# Patient Record
Sex: MALE | Race: WHITE | NOT HISPANIC OR LATINO
[De-identification: names, ages, dates, MRNs, and addresses within clinical notes are randomized per-mention and may not be internally consistent; named-entity substitution may affect disease eponyms.]

---

## 2017-02-08 ENCOUNTER — RX RENEWAL (OUTPATIENT)
Age: 57
End: 2017-02-08

## 2017-03-02 ENCOUNTER — OUTPATIENT (OUTPATIENT)
Dept: OUTPATIENT SERVICES | Facility: HOSPITAL | Age: 57
LOS: 1 days | End: 2017-03-02
Payer: COMMERCIAL

## 2017-03-02 VITALS
HEART RATE: 74 BPM | WEIGHT: 263.89 LBS | DIASTOLIC BLOOD PRESSURE: 82 MMHG | HEIGHT: 70 IN | TEMPERATURE: 99 F | OXYGEN SATURATION: 98 % | RESPIRATION RATE: 20 BRPM | SYSTOLIC BLOOD PRESSURE: 124 MMHG

## 2017-03-02 DIAGNOSIS — M54.12 RADICULOPATHY, CERVICAL REGION: ICD-10-CM

## 2017-03-02 DIAGNOSIS — J45.20 MILD INTERMITTENT ASTHMA, UNCOMPLICATED: ICD-10-CM

## 2017-03-02 DIAGNOSIS — Z98.1 ARTHRODESIS STATUS: Chronic | ICD-10-CM

## 2017-03-02 DIAGNOSIS — M48.02 SPINAL STENOSIS, CERVICAL REGION: ICD-10-CM

## 2017-03-02 DIAGNOSIS — Z01.818 ENCOUNTER FOR OTHER PREPROCEDURAL EXAMINATION: ICD-10-CM

## 2017-03-02 DIAGNOSIS — Z86.69 PERSONAL HISTORY OF OTHER DISEASES OF THE NERVOUS SYSTEM AND SENSE ORGANS: ICD-10-CM

## 2017-03-02 DIAGNOSIS — Z98.89 OTHER SPECIFIED POSTPROCEDURAL STATES: Chronic | ICD-10-CM

## 2017-03-02 DIAGNOSIS — I10 ESSENTIAL (PRIMARY) HYPERTENSION: ICD-10-CM

## 2017-03-02 DIAGNOSIS — Z90.49 ACQUIRED ABSENCE OF OTHER SPECIFIED PARTS OF DIGESTIVE TRACT: Chronic | ICD-10-CM

## 2017-03-02 LAB
ANION GAP SERPL CALC-SCNC: 14 MMOL/L — SIGNIFICANT CHANGE UP (ref 5–17)
BLD GP AB SCN SERPL QL: NEGATIVE — SIGNIFICANT CHANGE UP
BUN SERPL-MCNC: 8 MG/DL — SIGNIFICANT CHANGE UP (ref 7–23)
CALCIUM SERPL-MCNC: 9.6 MG/DL — SIGNIFICANT CHANGE UP (ref 8.4–10.5)
CHLORIDE SERPL-SCNC: 103 MMOL/L — SIGNIFICANT CHANGE UP (ref 96–108)
CO2 SERPL-SCNC: 19 MMOL/L — LOW (ref 22–31)
CREAT SERPL-MCNC: 0.81 MG/DL — SIGNIFICANT CHANGE UP (ref 0.5–1.3)
GLUCOSE SERPL-MCNC: 101 MG/DL — HIGH (ref 70–99)
HCT VFR BLD CALC: 45.7 % — SIGNIFICANT CHANGE UP (ref 39–50)
HGB BLD-MCNC: 15.4 G/DL — SIGNIFICANT CHANGE UP (ref 13–17)
MCHC RBC-ENTMCNC: 29.5 PG — SIGNIFICANT CHANGE UP (ref 27–34)
MCHC RBC-ENTMCNC: 33.7 GM/DL — SIGNIFICANT CHANGE UP (ref 32–36)
MCV RBC AUTO: 87.5 FL — SIGNIFICANT CHANGE UP (ref 80–100)
PLATELET # BLD AUTO: 277 K/UL — SIGNIFICANT CHANGE UP (ref 150–400)
POTASSIUM SERPL-MCNC: 4.3 MMOL/L — SIGNIFICANT CHANGE UP (ref 3.5–5.3)
POTASSIUM SERPL-SCNC: 4.3 MMOL/L — SIGNIFICANT CHANGE UP (ref 3.5–5.3)
RBC # BLD: 5.22 M/UL — SIGNIFICANT CHANGE UP (ref 4.2–5.8)
RBC # FLD: 13.9 % — SIGNIFICANT CHANGE UP (ref 10.3–14.5)
RH IG SCN BLD-IMP: POSITIVE — SIGNIFICANT CHANGE UP
SODIUM SERPL-SCNC: 136 MMOL/L — SIGNIFICANT CHANGE UP (ref 135–145)
WBC # BLD: 9.2 K/UL — SIGNIFICANT CHANGE UP (ref 3.8–10.5)
WBC # FLD AUTO: 9.2 K/UL — SIGNIFICANT CHANGE UP (ref 3.8–10.5)

## 2017-03-02 PROCEDURE — 80048 BASIC METABOLIC PNL TOTAL CA: CPT

## 2017-03-02 PROCEDURE — 86900 BLOOD TYPING SEROLOGIC ABO: CPT

## 2017-03-02 PROCEDURE — 85027 COMPLETE CBC AUTOMATED: CPT

## 2017-03-02 PROCEDURE — 86901 BLOOD TYPING SEROLOGIC RH(D): CPT

## 2017-03-02 PROCEDURE — G0463: CPT

## 2017-03-02 PROCEDURE — 86850 RBC ANTIBODY SCREEN: CPT

## 2017-03-02 RX ORDER — CEFAZOLIN SODIUM 1 G
2000 VIAL (EA) INJECTION ONCE
Qty: 0 | Refills: 0 | Status: DISCONTINUED | OUTPATIENT
Start: 2017-03-06 | End: 2017-03-07

## 2017-03-02 RX ORDER — SODIUM CHLORIDE 9 MG/ML
3 INJECTION INTRAMUSCULAR; INTRAVENOUS; SUBCUTANEOUS EVERY 8 HOURS
Qty: 0 | Refills: 0 | Status: DISCONTINUED | OUTPATIENT
Start: 2017-03-06 | End: 2017-03-06

## 2017-03-02 NOTE — H&P PST ADULT - HISTORY OF PRESENT ILLNESS
56 year old male with HTN, c/o numbness and pain radiating to right upper extremity, scheduled for C5-6 anterior cervical discectomy and fusion for radiculopathy, cervical region.

## 2017-03-02 NOTE — H&P PST ADULT - PMH
BPH (benign prostatic hypertrophy)    H/O sleep apnea  uses cpap  HTN (hypertension)    Mild intermittent asthma without complication  no h/o intubation for asthma  Stenosis of cervical spine  C6

## 2017-03-02 NOTE — H&P PST ADULT - PROBLEM SELECTOR PLAN 2
patient instructed to continue antihypertensive medication keith-op  patient is scheduled to be evaluated by his cardiologist 3/2/17

## 2017-03-03 ENCOUNTER — APPOINTMENT (OUTPATIENT)
Dept: CARDIOLOGY | Facility: CLINIC | Age: 57
End: 2017-03-03

## 2017-03-03 ENCOUNTER — NON-APPOINTMENT (OUTPATIENT)
Age: 57
End: 2017-03-03

## 2017-03-03 DIAGNOSIS — M54.12 RADICULOPATHY, CERVICAL REGION: ICD-10-CM

## 2017-03-03 DIAGNOSIS — N40.0 BENIGN PROSTATIC HYPERPLASIA WITHOUT LOWER URINARY TRACT SYMPMS: ICD-10-CM

## 2017-03-03 DIAGNOSIS — Z01.810 ENCOUNTER FOR PREPROCEDURAL CARDIOVASCULAR EXAMINATION: ICD-10-CM

## 2017-03-03 DIAGNOSIS — M54.2 CERVICALGIA: ICD-10-CM

## 2017-03-03 DIAGNOSIS — G89.29 CERVICALGIA: ICD-10-CM

## 2017-03-03 DIAGNOSIS — Z78.9 OTHER SPECIFIED HEALTH STATUS: ICD-10-CM

## 2017-03-03 DIAGNOSIS — R94.31 ABNORMAL ELECTROCARDIOGRAM [ECG] [EKG]: ICD-10-CM

## 2017-03-04 RX ORDER — VENLAFAXINE HYDROCHLORIDE 37.5 MG/1
37.5 CAPSULE, EXTENDED RELEASE ORAL
Qty: 30 | Refills: 0 | Status: DISCONTINUED | COMMUNITY
Start: 2017-01-03

## 2017-03-04 RX ORDER — ACYCLOVIR 400 MG/1
400 TABLET ORAL
Qty: 15 | Refills: 0 | Status: DISCONTINUED | COMMUNITY
Start: 2017-02-10

## 2017-03-04 RX ORDER — FLUTICASONE FUROATE AND VILANTEROL TRIFENATATE 100; 25 UG/1; UG/1
100-25 POWDER RESPIRATORY (INHALATION)
Qty: 60 | Refills: 0 | Status: ACTIVE | COMMUNITY
Start: 2016-10-27

## 2017-03-04 RX ORDER — AZELASTINE HYDROCHLORIDE AND FLUTICASONE PROPIONATE 137; 50 UG/1; UG/1
137-50 SPRAY, METERED NASAL
Qty: 23 | Refills: 0 | Status: ACTIVE | COMMUNITY
Start: 2016-09-14

## 2017-03-04 RX ORDER — DULOXETINE HYDROCHLORIDE 30 MG/1
30 CAPSULE, DELAYED RELEASE PELLETS ORAL
Qty: 30 | Refills: 0 | Status: DISCONTINUED | COMMUNITY
Start: 2016-09-29

## 2017-03-04 RX ORDER — VENLAFAXINE 37.5 MG/1
37.5 TABLET, EXTENDED RELEASE ORAL
Qty: 30 | Refills: 0 | Status: DISCONTINUED | COMMUNITY
Start: 2016-11-22

## 2017-03-06 ENCOUNTER — INPATIENT (INPATIENT)
Facility: HOSPITAL | Age: 57
LOS: 1 days | Discharge: HOME | DRG: 473 | End: 2017-03-08
Attending: NEUROLOGICAL SURGERY | Admitting: NEUROLOGICAL SURGERY
Payer: COMMERCIAL

## 2017-03-06 VITALS
HEART RATE: 68 BPM | WEIGHT: 263.89 LBS | DIASTOLIC BLOOD PRESSURE: 86 MMHG | RESPIRATION RATE: 20 BRPM | TEMPERATURE: 98 F | SYSTOLIC BLOOD PRESSURE: 138 MMHG | OXYGEN SATURATION: 96 % | HEIGHT: 70 IN

## 2017-03-06 DIAGNOSIS — M54.12 RADICULOPATHY, CERVICAL REGION: ICD-10-CM

## 2017-03-06 DIAGNOSIS — Z90.49 ACQUIRED ABSENCE OF OTHER SPECIFIED PARTS OF DIGESTIVE TRACT: Chronic | ICD-10-CM

## 2017-03-06 DIAGNOSIS — Z98.1 ARTHRODESIS STATUS: Chronic | ICD-10-CM

## 2017-03-06 DIAGNOSIS — Z98.89 OTHER SPECIFIED POSTPROCEDURAL STATES: Chronic | ICD-10-CM

## 2017-03-06 LAB
ANION GAP SERPL CALC-SCNC: 12 MMOL/L — SIGNIFICANT CHANGE UP (ref 5–17)
BASOPHILS # BLD AUTO: 0 K/UL — SIGNIFICANT CHANGE UP (ref 0–0.2)
BASOPHILS NFR BLD AUTO: 0.3 % — SIGNIFICANT CHANGE UP (ref 0–2)
BUN SERPL-MCNC: 9 MG/DL — SIGNIFICANT CHANGE UP (ref 7–23)
CALCIUM SERPL-MCNC: 8.6 MG/DL — SIGNIFICANT CHANGE UP (ref 8.4–10.5)
CHLORIDE SERPL-SCNC: 105 MMOL/L — SIGNIFICANT CHANGE UP (ref 96–108)
CO2 SERPL-SCNC: 23 MMOL/L — SIGNIFICANT CHANGE UP (ref 22–31)
CREAT SERPL-MCNC: 0.8 MG/DL — SIGNIFICANT CHANGE UP (ref 0.5–1.3)
EOSINOPHIL # BLD AUTO: 0.3 K/UL — SIGNIFICANT CHANGE UP (ref 0–0.5)
EOSINOPHIL NFR BLD AUTO: 2.2 % — SIGNIFICANT CHANGE UP (ref 0–6)
GLUCOSE SERPL-MCNC: 117 MG/DL — HIGH (ref 70–99)
HCT VFR BLD CALC: 42.2 % — SIGNIFICANT CHANGE UP (ref 39–50)
HGB BLD-MCNC: 14.3 G/DL — SIGNIFICANT CHANGE UP (ref 13–17)
LYMPHOCYTES # BLD AUTO: 19.9 % — SIGNIFICANT CHANGE UP (ref 13–44)
LYMPHOCYTES # BLD AUTO: 2.2 K/UL — SIGNIFICANT CHANGE UP (ref 1–3.3)
MCHC RBC-ENTMCNC: 30.2 PG — SIGNIFICANT CHANGE UP (ref 27–34)
MCHC RBC-ENTMCNC: 34 GM/DL — SIGNIFICANT CHANGE UP (ref 32–36)
MCV RBC AUTO: 88.8 FL — SIGNIFICANT CHANGE UP (ref 80–100)
MONOCYTES # BLD AUTO: 0.9 K/UL — SIGNIFICANT CHANGE UP (ref 0–0.9)
MONOCYTES NFR BLD AUTO: 8.4 % — SIGNIFICANT CHANGE UP (ref 2–14)
NEUTROPHILS # BLD AUTO: 7.8 K/UL — HIGH (ref 1.8–7.4)
NEUTROPHILS NFR BLD AUTO: 69.2 % — SIGNIFICANT CHANGE UP (ref 43–77)
PLATELET # BLD AUTO: 250 K/UL — SIGNIFICANT CHANGE UP (ref 150–400)
POTASSIUM SERPL-MCNC: 3.9 MMOL/L — SIGNIFICANT CHANGE UP (ref 3.5–5.3)
POTASSIUM SERPL-SCNC: 3.9 MMOL/L — SIGNIFICANT CHANGE UP (ref 3.5–5.3)
RBC # BLD: 4.75 M/UL — SIGNIFICANT CHANGE UP (ref 4.2–5.8)
RBC # FLD: 12.9 % — SIGNIFICANT CHANGE UP (ref 10.3–14.5)
RH IG SCN BLD-IMP: POSITIVE — SIGNIFICANT CHANGE UP
SODIUM SERPL-SCNC: 140 MMOL/L — SIGNIFICANT CHANGE UP (ref 135–145)
WBC # BLD: 11.2 K/UL — HIGH (ref 3.8–10.5)
WBC # FLD AUTO: 11.2 K/UL — HIGH (ref 3.8–10.5)

## 2017-03-06 RX ORDER — HYDROMORPHONE HYDROCHLORIDE 2 MG/ML
1 INJECTION INTRAMUSCULAR; INTRAVENOUS; SUBCUTANEOUS EVERY 6 HOURS
Qty: 0 | Refills: 0 | Status: DISCONTINUED | OUTPATIENT
Start: 2017-03-06 | End: 2017-03-08

## 2017-03-06 RX ORDER — CEFAZOLIN SODIUM 1 G
2000 VIAL (EA) INJECTION EVERY 8 HOURS
Qty: 0 | Refills: 0 | Status: COMPLETED | OUTPATIENT
Start: 2017-03-06 | End: 2017-03-07

## 2017-03-06 RX ORDER — ONDANSETRON 8 MG/1
4 TABLET, FILM COATED ORAL EVERY 4 HOURS
Qty: 0 | Refills: 0 | Status: DISCONTINUED | OUTPATIENT
Start: 2017-03-06 | End: 2017-03-08

## 2017-03-06 RX ORDER — ONDANSETRON 8 MG/1
4 TABLET, FILM COATED ORAL ONCE
Qty: 0 | Refills: 0 | Status: DISCONTINUED | OUTPATIENT
Start: 2017-03-06 | End: 2017-03-07

## 2017-03-06 RX ORDER — GABAPENTIN 400 MG/1
300 CAPSULE ORAL THREE TIMES A DAY
Qty: 0 | Refills: 0 | Status: DISCONTINUED | OUTPATIENT
Start: 2017-03-06 | End: 2017-03-08

## 2017-03-06 RX ORDER — DILTIAZEM HCL 120 MG
240 CAPSULE, EXT RELEASE 24 HR ORAL DAILY
Qty: 0 | Refills: 0 | Status: DISCONTINUED | OUTPATIENT
Start: 2017-03-06 | End: 2017-03-08

## 2017-03-06 RX ORDER — FLUTICASONE PROPIONATE AND SALMETEROL 50; 250 UG/1; UG/1
1 POWDER ORAL; RESPIRATORY (INHALATION)
Qty: 0 | Refills: 0 | Status: DISCONTINUED | OUTPATIENT
Start: 2017-03-06 | End: 2017-03-08

## 2017-03-06 RX ORDER — DOCUSATE SODIUM 100 MG
100 CAPSULE ORAL THREE TIMES A DAY
Qty: 0 | Refills: 0 | Status: DISCONTINUED | OUTPATIENT
Start: 2017-03-06 | End: 2017-03-08

## 2017-03-06 RX ORDER — SENNA PLUS 8.6 MG/1
2 TABLET ORAL AT BEDTIME
Qty: 0 | Refills: 0 | Status: DISCONTINUED | OUTPATIENT
Start: 2017-03-06 | End: 2017-03-08

## 2017-03-06 RX ORDER — ACETAMINOPHEN 500 MG
650 TABLET ORAL EVERY 6 HOURS
Qty: 0 | Refills: 0 | Status: DISCONTINUED | OUTPATIENT
Start: 2017-03-06 | End: 2017-03-08

## 2017-03-06 RX ORDER — ENOXAPARIN SODIUM 100 MG/ML
40 INJECTION SUBCUTANEOUS EVERY 24 HOURS
Qty: 0 | Refills: 0 | Status: DISCONTINUED | OUTPATIENT
Start: 2017-03-07 | End: 2017-03-08

## 2017-03-06 RX ORDER — VENLAFAXINE HCL 75 MG
37.5 CAPSULE, EXT RELEASE 24 HR ORAL DAILY
Qty: 0 | Refills: 0 | Status: DISCONTINUED | OUTPATIENT
Start: 2017-03-06 | End: 2017-03-08

## 2017-03-06 RX ORDER — LISINOPRIL 2.5 MG/1
10 TABLET ORAL DAILY
Qty: 0 | Refills: 0 | Status: DISCONTINUED | OUTPATIENT
Start: 2017-03-06 | End: 2017-03-08

## 2017-03-06 RX ORDER — SODIUM CHLORIDE 9 MG/ML
1000 INJECTION INTRAMUSCULAR; INTRAVENOUS; SUBCUTANEOUS
Qty: 0 | Refills: 0 | Status: DISCONTINUED | OUTPATIENT
Start: 2017-03-06 | End: 2017-03-08

## 2017-03-06 RX ORDER — HYDROMORPHONE HYDROCHLORIDE 2 MG/ML
0.5 INJECTION INTRAMUSCULAR; INTRAVENOUS; SUBCUTANEOUS
Qty: 0 | Refills: 0 | Status: DISCONTINUED | OUTPATIENT
Start: 2017-03-06 | End: 2017-03-06

## 2017-03-06 RX ADMIN — HYDROMORPHONE HYDROCHLORIDE 0.5 MILLIGRAM(S): 2 INJECTION INTRAMUSCULAR; INTRAVENOUS; SUBCUTANEOUS at 19:00

## 2017-03-06 RX ADMIN — HYDROMORPHONE HYDROCHLORIDE 0.5 MILLIGRAM(S): 2 INJECTION INTRAMUSCULAR; INTRAVENOUS; SUBCUTANEOUS at 18:15

## 2017-03-06 RX ADMIN — SODIUM CHLORIDE 100 MILLILITER(S): 9 INJECTION INTRAMUSCULAR; INTRAVENOUS; SUBCUTANEOUS at 19:18

## 2017-03-06 RX ADMIN — FLUTICASONE PROPIONATE AND SALMETEROL 1 DOSE(S): 50; 250 POWDER ORAL; RESPIRATORY (INHALATION) at 23:00

## 2017-03-06 RX ADMIN — Medication 100 MILLIGRAM(S): at 21:35

## 2017-03-06 RX ADMIN — HYDROMORPHONE HYDROCHLORIDE 0.5 MILLIGRAM(S): 2 INJECTION INTRAMUSCULAR; INTRAVENOUS; SUBCUTANEOUS at 18:30

## 2017-03-06 RX ADMIN — Medication 100 MILLIGRAM(S): at 23:08

## 2017-03-06 RX ADMIN — GABAPENTIN 300 MILLIGRAM(S): 400 CAPSULE ORAL at 21:35

## 2017-03-06 RX ADMIN — HYDROMORPHONE HYDROCHLORIDE 0.5 MILLIGRAM(S): 2 INJECTION INTRAMUSCULAR; INTRAVENOUS; SUBCUTANEOUS at 18:00

## 2017-03-06 RX ADMIN — SENNA PLUS 2 TABLET(S): 8.6 TABLET ORAL at 21:35

## 2017-03-06 RX ADMIN — Medication 240 MILLIGRAM(S): at 22:01

## 2017-03-06 RX ADMIN — HYDROMORPHONE HYDROCHLORIDE 0.5 MILLIGRAM(S): 2 INJECTION INTRAMUSCULAR; INTRAVENOUS; SUBCUTANEOUS at 18:45

## 2017-03-06 RX ADMIN — HYDROMORPHONE HYDROCHLORIDE 0.5 MILLIGRAM(S): 2 INJECTION INTRAMUSCULAR; INTRAVENOUS; SUBCUTANEOUS at 17:45

## 2017-03-07 ENCOUNTER — TRANSCRIPTION ENCOUNTER (OUTPATIENT)
Age: 57
End: 2017-03-07

## 2017-03-07 LAB
ANION GAP SERPL CALC-SCNC: 15 MMOL/L — SIGNIFICANT CHANGE UP (ref 5–17)
BASOPHILS # BLD AUTO: 0 K/UL — SIGNIFICANT CHANGE UP (ref 0–0.2)
BASOPHILS NFR BLD AUTO: 0 % — SIGNIFICANT CHANGE UP (ref 0–2)
BUN SERPL-MCNC: 9 MG/DL — SIGNIFICANT CHANGE UP (ref 7–23)
CALCIUM SERPL-MCNC: 8.8 MG/DL — SIGNIFICANT CHANGE UP (ref 8.4–10.5)
CHLORIDE SERPL-SCNC: 101 MMOL/L — SIGNIFICANT CHANGE UP (ref 96–108)
CO2 SERPL-SCNC: 20 MMOL/L — LOW (ref 22–31)
CREAT SERPL-MCNC: 0.8 MG/DL — SIGNIFICANT CHANGE UP (ref 0.5–1.3)
EOSINOPHIL # BLD AUTO: 0 K/UL — SIGNIFICANT CHANGE UP (ref 0–0.5)
EOSINOPHIL NFR BLD AUTO: 0.2 % — SIGNIFICANT CHANGE UP (ref 0–6)
GLUCOSE SERPL-MCNC: 202 MG/DL — HIGH (ref 70–99)
HCT VFR BLD CALC: 41.2 % — SIGNIFICANT CHANGE UP (ref 39–50)
HGB BLD-MCNC: 13.9 G/DL — SIGNIFICANT CHANGE UP (ref 13–17)
LYMPHOCYTES # BLD AUTO: 0.7 K/UL — LOW (ref 1–3.3)
LYMPHOCYTES # BLD AUTO: 5.8 % — LOW (ref 13–44)
MCHC RBC-ENTMCNC: 29.9 PG — SIGNIFICANT CHANGE UP (ref 27–34)
MCHC RBC-ENTMCNC: 33.9 GM/DL — SIGNIFICANT CHANGE UP (ref 32–36)
MCV RBC AUTO: 88.4 FL — SIGNIFICANT CHANGE UP (ref 80–100)
MONOCYTES # BLD AUTO: 0.3 K/UL — SIGNIFICANT CHANGE UP (ref 0–0.9)
MONOCYTES NFR BLD AUTO: 3 % — SIGNIFICANT CHANGE UP (ref 2–14)
NEUTROPHILS # BLD AUTO: 10.3 K/UL — HIGH (ref 1.8–7.4)
NEUTROPHILS NFR BLD AUTO: 91 % — HIGH (ref 43–77)
PLATELET # BLD AUTO: 244 K/UL — SIGNIFICANT CHANGE UP (ref 150–400)
POTASSIUM SERPL-MCNC: 4.5 MMOL/L — SIGNIFICANT CHANGE UP (ref 3.5–5.3)
POTASSIUM SERPL-SCNC: 4.5 MMOL/L — SIGNIFICANT CHANGE UP (ref 3.5–5.3)
RBC # BLD: 4.66 M/UL — SIGNIFICANT CHANGE UP (ref 4.2–5.8)
RBC # FLD: 12.6 % — SIGNIFICANT CHANGE UP (ref 10.3–14.5)
SODIUM SERPL-SCNC: 136 MMOL/L — SIGNIFICANT CHANGE UP (ref 135–145)
WBC # BLD: 11.4 K/UL — HIGH (ref 3.8–10.5)
WBC # FLD AUTO: 11.4 K/UL — HIGH (ref 3.8–10.5)

## 2017-03-07 PROCEDURE — 72125 CT NECK SPINE W/O DYE: CPT | Mod: 26

## 2017-03-07 RX ORDER — DOCUSATE SODIUM 100 MG
1 CAPSULE ORAL
Qty: 0 | Refills: 0 | COMMUNITY
Start: 2017-03-07

## 2017-03-07 RX ORDER — OXYCODONE AND ACETAMINOPHEN 5; 325 MG/1; MG/1
1 TABLET ORAL
Qty: 42 | Refills: 0
Start: 2017-03-07 | End: 2017-03-14

## 2017-03-07 RX ORDER — ACETAMINOPHEN 500 MG
2 TABLET ORAL
Qty: 0 | Refills: 0 | COMMUNITY
Start: 2017-03-07

## 2017-03-07 RX ORDER — SENNA PLUS 8.6 MG/1
2 TABLET ORAL
Qty: 0 | Refills: 0 | COMMUNITY
Start: 2017-03-07

## 2017-03-07 RX ORDER — BENZOCAINE 10 %
1 GEL (GRAM) MUCOUS MEMBRANE EVERY 8 HOURS
Qty: 0 | Refills: 0 | Status: DISCONTINUED | OUTPATIENT
Start: 2017-03-07 | End: 2017-03-08

## 2017-03-07 RX ORDER — SENNA PLUS 8.6 MG/1
2 TABLET ORAL
Qty: 20 | Refills: 0
Start: 2017-03-07 | End: 2017-03-17

## 2017-03-07 RX ORDER — DOCUSATE SODIUM 100 MG
1 CAPSULE ORAL
Qty: 30 | Refills: 0
Start: 2017-03-07 | End: 2017-03-17

## 2017-03-07 RX ORDER — SODIUM CHLORIDE 0.65 %
1 AEROSOL, SPRAY (ML) NASAL EVERY 6 HOURS
Qty: 0 | Refills: 0 | Status: DISCONTINUED | OUTPATIENT
Start: 2017-03-07 | End: 2017-03-08

## 2017-03-07 RX ORDER — ACETAMINOPHEN 500 MG
2 TABLET ORAL
Qty: 0 | Refills: 0 | DISCHARGE
Start: 2017-03-07

## 2017-03-07 RX ORDER — OXYCODONE HYDROCHLORIDE 5 MG/1
1 TABLET ORAL
Qty: 0 | Refills: 0 | COMMUNITY
Start: 2017-03-07

## 2017-03-07 RX ADMIN — GABAPENTIN 300 MILLIGRAM(S): 400 CAPSULE ORAL at 21:40

## 2017-03-07 RX ADMIN — Medication 100 MILLIGRAM(S): at 14:22

## 2017-03-07 RX ADMIN — SODIUM CHLORIDE 100 MILLILITER(S): 9 INJECTION INTRAMUSCULAR; INTRAVENOUS; SUBCUTANEOUS at 06:00

## 2017-03-07 RX ADMIN — GABAPENTIN 300 MILLIGRAM(S): 400 CAPSULE ORAL at 14:22

## 2017-03-07 RX ADMIN — Medication 100 MILLIGRAM(S): at 06:15

## 2017-03-07 RX ADMIN — HYDROMORPHONE HYDROCHLORIDE 1 MILLIGRAM(S): 2 INJECTION INTRAMUSCULAR; INTRAVENOUS; SUBCUTANEOUS at 22:20

## 2017-03-07 RX ADMIN — LISINOPRIL 10 MILLIGRAM(S): 2.5 TABLET ORAL at 06:15

## 2017-03-07 RX ADMIN — FLUTICASONE PROPIONATE AND SALMETEROL 1 DOSE(S): 50; 250 POWDER ORAL; RESPIRATORY (INHALATION) at 11:23

## 2017-03-07 RX ADMIN — Medication 100 MILLIGRAM(S): at 07:22

## 2017-03-07 RX ADMIN — FLUTICASONE PROPIONATE AND SALMETEROL 1 DOSE(S): 50; 250 POWDER ORAL; RESPIRATORY (INHALATION) at 21:40

## 2017-03-07 RX ADMIN — Medication 37.5 MILLIGRAM(S): at 12:58

## 2017-03-07 RX ADMIN — HYDROMORPHONE HYDROCHLORIDE 1 MILLIGRAM(S): 2 INJECTION INTRAMUSCULAR; INTRAVENOUS; SUBCUTANEOUS at 05:19

## 2017-03-07 RX ADMIN — HYDROMORPHONE HYDROCHLORIDE 1 MILLIGRAM(S): 2 INJECTION INTRAMUSCULAR; INTRAVENOUS; SUBCUTANEOUS at 22:08

## 2017-03-07 RX ADMIN — Medication 100 MILLIGRAM(S): at 21:40

## 2017-03-07 RX ADMIN — Medication 240 MILLIGRAM(S): at 21:40

## 2017-03-07 RX ADMIN — ENOXAPARIN SODIUM 40 MILLIGRAM(S): 100 INJECTION SUBCUTANEOUS at 11:24

## 2017-03-07 RX ADMIN — HYDROMORPHONE HYDROCHLORIDE 1 MILLIGRAM(S): 2 INJECTION INTRAMUSCULAR; INTRAVENOUS; SUBCUTANEOUS at 04:59

## 2017-03-07 RX ADMIN — SENNA PLUS 2 TABLET(S): 8.6 TABLET ORAL at 21:40

## 2017-03-07 RX ADMIN — GABAPENTIN 300 MILLIGRAM(S): 400 CAPSULE ORAL at 06:15

## 2017-03-07 NOTE — PHYSICAL THERAPY INITIAL EVALUATION ADULT - PERTINENT HX OF CURRENT PROBLEM, REHAB EVAL
Pt is 56 year old male with HTN, c/o numbness and pain radiating to right upper extremity, scheduled for C5-6 anterior cervical discectomy and fusion for radiculopathy, cervical region.

## 2017-03-07 NOTE — DISCHARGE NOTE ADULT - HOSPITAL COURSE
The patient was seen in PST on 3/2/2017 and admitted via SDA on 3/6/2017 and taken to the OR this same day.  Postop course was uneventful.  The patient was on Ancef, SQL and Percocet for routine postop management.  His pace was DC on 3/7/2017 and the patient voided without difficulty.    The patient was evaluated by PT and no skill needs was recommended .   He was subsequently DC on 3/7/2017 in stable condition. The patient was seen in PST on 3/2/2017 and admitted via SDA on 3/6/2017 and taken to the OR this same day.  Postop course was uneventful.  The patient was on Ancef, SQL and Percocet for routine postop management.  His pace was DC on 3/7/2017 and the patient voided without difficulty.    The patient was evaluated by PT and no skill needs was recommended .   He was subsequently DC on 3/8/2017 in stable condition.

## 2017-03-07 NOTE — DISCHARGE NOTE ADULT - REASON FOR ADMISSION
6 year old male with HTN, c/o numbness and pain radiating to right upper extremity, scheduled for C5-6 anterior cervical discectomy and fusion for radiculopathy, cervical region.

## 2017-03-07 NOTE — DISCHARGE NOTE ADULT - PATIENT PORTAL LINK FT
“You can access the [facility] Patient Portal, offered by [facility], by registering with the following website: [email or web address]”

## 2017-03-07 NOTE — DISCHARGE NOTE ADULT - CARE PROVIDER_API CALL
Marco Antonio Kumar (MD), Neurological Surgery  [address] [place] [address]  Phone: [phone]  Fax: [phone]

## 2017-03-07 NOTE — DISCHARGE NOTE ADULT - NS AS ACTIVITY OBS
Do not drive or operate machinery/Stairs allowed/Walking-Indoors allowed/No Heavy lifting/straining/Walking-Outdoors allowed/Do not make important decisions/Showering allowed

## 2017-03-07 NOTE — PHYSICAL THERAPY INITIAL EVALUATION ADULT - ADDITIONAL COMMENTS
Pt states he lives in a private home with 4 steps to enter and +HR. Pt states he was independent with all ADLs and ambulation. Pt states his family is available to assist when needed.

## 2017-03-07 NOTE — DISCHARGE NOTE ADULT - MEDICATION SUMMARY - MEDICATIONS TO TAKE
I will START or STAY ON the medications listed below when I get home from the hospital:    acetaminophen 325 mg oral tablet  -- 2 tab(s) by mouth every 6 hours, As needed, Mild Pain (1 - 3)  -- Indication: For pain    acetaminophen-oxyCODONE 325 mg-5 mg oral tablet  -- 1 to 2 tab(s) by mouth every 4 hours, As needed, Moderate to sever Pain MDD:5  -- Indication: For pain    ramipril 2.5 mg oral tablet  --  by mouth   evening  -- Indication: For Blood pressure     diltiaZEM 240 mg/24 hours oral capsule, extended release  -- 1 cap(s) by mouth once a day  -- Indication: For Heart med    gabapentin 300 mg oral tablet  --  by mouth   three times a day   -- Indication: For pain    venlafaxine 37.5 mg oral capsule, extended release  -- 1 cap(s) by mouth once a day  -- Indication: For Mood     Bystolic  --  by mouth   30 mg oral daily (morning)  -- Indication: For Blood pressure     Advair Diskus 250 mcg-50 mcg inhalation powder  --  inhaled   -- Indication: For Breathing aid     docusate sodium 100 mg oral capsule  -- 1 cap(s) by mouth 3 times a day  -- Indication: For stool softner     senna oral tablet  -- 2 tab(s) by mouth once a day (at bedtime)  -- Indication: For stool softner I will START or STAY ON the medications listed below when I get home from the hospital:    outpatient physical therpay   -- 3 times a week for four weeks   -- Indication: For physical therapy     acetaminophen 325 mg oral tablet  -- 2 tab(s) by mouth every 6 hours, As needed, Mild Pain (1 - 3)  -- Indication: For pain    acetaminophen-oxyCODONE 325 mg-5 mg oral tablet  -- 1 to 2 tab(s) by mouth every 4 hours, As needed, Moderate to sever Pain MDD:5  -- Indication: For pain    ramipril 2.5 mg oral tablet  --  by mouth   evening  -- Indication: For Blood pressure     diltiaZEM 240 mg/24 hours oral capsule, extended release  -- 1 cap(s) by mouth once a day  -- Indication: For Heart med    gabapentin 300 mg oral tablet  --  by mouth   three times a day   -- Indication: For pain    venlafaxine 37.5 mg oral capsule, extended release  -- 1 cap(s) by mouth once a day  -- Indication: For Mood     Bystolic  --  by mouth   30 mg oral daily (morning)  -- Indication: For Blood pressure     Advair Diskus 250 mcg-50 mcg inhalation powder  --  inhaled   -- Indication: For Breathing aid     docusate sodium 100 mg oral capsule  -- 1 cap(s) by mouth 3 times a day  -- Indication: For stool softner     senna oral tablet  -- 2 tab(s) by mouth once a day (at bedtime)  -- Indication: For stool softner

## 2017-03-07 NOTE — DISCHARGE NOTE ADULT - ADDITIONAL INSTRUCTIONS
may take shower 4 days after surgery-use Tegaderm to cover surgical site during shower-remove and pat dry after shower.

## 2017-03-07 NOTE — DISCHARGE NOTE ADULT - CARE PLAN
Principal Discharge DX:	Stenosis of cervical spine  Goal:	increase activity  Instructions for follow-up, activity and diet:	follow up with Dr. Kumar in 10 days  follow up withj pmd in 2 weeks  Secondary Diagnosis:	H/O sleep apnea  Secondary Diagnosis:	Essential hypertension

## 2017-03-07 NOTE — PHYSICAL THERAPY INITIAL EVALUATION ADULT - DISCHARGE DISPOSITION, PT EVAL
home w/ outpatient services/Pt states family available to assist when needed. Patient is cleared for D/C home from physical therapy standpoint. Patient is agreeable, GEETA Tran and NAYANA Ervin aware.

## 2017-03-08 VITALS
OXYGEN SATURATION: 98 % | HEART RATE: 109 BPM | DIASTOLIC BLOOD PRESSURE: 88 MMHG | TEMPERATURE: 98 F | SYSTOLIC BLOOD PRESSURE: 151 MMHG | RESPIRATION RATE: 18 BRPM

## 2017-03-08 PROCEDURE — 72125 CT NECK SPINE W/O DYE: CPT

## 2017-03-08 PROCEDURE — C1889: CPT

## 2017-03-08 PROCEDURE — 85027 COMPLETE CBC AUTOMATED: CPT

## 2017-03-08 PROCEDURE — C1713: CPT

## 2017-03-08 PROCEDURE — 94640 AIRWAY INHALATION TREATMENT: CPT

## 2017-03-08 PROCEDURE — 80048 BASIC METABOLIC PNL TOTAL CA: CPT

## 2017-03-08 PROCEDURE — 97161 PT EVAL LOW COMPLEX 20 MIN: CPT

## 2017-03-08 PROCEDURE — 86901 BLOOD TYPING SEROLOGIC RH(D): CPT

## 2017-03-08 PROCEDURE — C1769: CPT

## 2017-03-08 PROCEDURE — 76000 FLUOROSCOPY <1 HR PHYS/QHP: CPT

## 2017-03-08 PROCEDURE — 86900 BLOOD TYPING SEROLOGIC ABO: CPT

## 2017-03-08 RX ORDER — BENZOCAINE 10 %
1 GEL (GRAM) MUCOUS MEMBRANE EVERY 8 HOURS
Qty: 0 | Refills: 0 | Status: DISCONTINUED | OUTPATIENT
Start: 2017-03-08 | End: 2017-03-08

## 2017-03-08 RX ORDER — BENZOCAINE AND MENTHOL 5; 1 G/100ML; G/100ML
1 LIQUID ORAL ONCE
Qty: 0 | Refills: 0 | Status: COMPLETED | OUTPATIENT
Start: 2017-03-08 | End: 2017-03-08

## 2017-03-08 RX ADMIN — Medication 100 MILLIGRAM(S): at 09:56

## 2017-03-08 RX ADMIN — ENOXAPARIN SODIUM 40 MILLIGRAM(S): 100 INJECTION SUBCUTANEOUS at 09:57

## 2017-03-08 RX ADMIN — FLUTICASONE PROPIONATE AND SALMETEROL 1 DOSE(S): 50; 250 POWDER ORAL; RESPIRATORY (INHALATION) at 09:59

## 2017-03-08 RX ADMIN — Medication 650 MILLIGRAM(S): at 06:03

## 2017-03-08 RX ADMIN — GABAPENTIN 300 MILLIGRAM(S): 400 CAPSULE ORAL at 06:03

## 2017-03-08 RX ADMIN — BENZOCAINE AND MENTHOL 1 LOZENGE: 5; 1 LIQUID ORAL at 11:25

## 2017-03-08 RX ADMIN — Medication 1 SPRAY(S): at 06:03

## 2017-03-08 RX ADMIN — LISINOPRIL 10 MILLIGRAM(S): 2.5 TABLET ORAL at 06:03

## 2017-03-08 RX ADMIN — Medication 37.5 MILLIGRAM(S): at 09:56

## 2017-03-08 RX ADMIN — Medication 100 MILLIGRAM(S): at 06:03

## 2018-04-18 ENCOUNTER — MEDICATION RENEWAL (OUTPATIENT)
Age: 58
End: 2018-04-18

## 2018-04-20 ENCOUNTER — RX RENEWAL (OUTPATIENT)
Age: 58
End: 2018-04-20

## 2018-07-16 PROBLEM — J45.20 MILD INTERMITTENT ASTHMA, UNCOMPLICATED: Chronic | Status: ACTIVE | Noted: 2017-03-02

## 2018-11-12 ENCOUNTER — MEDICATION RENEWAL (OUTPATIENT)
Age: 58
End: 2018-11-12

## 2018-11-12 PROBLEM — Z86.69 PERSONAL HISTORY OF OTHER DISEASES OF THE NERVOUS SYSTEM AND SENSE ORGANS: Chronic | Status: ACTIVE | Noted: 2017-03-02

## 2018-11-12 PROBLEM — N40.0 BENIGN PROSTATIC HYPERPLASIA WITHOUT LOWER URINARY TRACT SYMPTOMS: Chronic | Status: ACTIVE | Noted: 2017-03-02

## 2018-12-10 ENCOUNTER — MEDICATION RENEWAL (OUTPATIENT)
Age: 58
End: 2018-12-10

## 2019-01-08 ENCOUNTER — NON-APPOINTMENT (OUTPATIENT)
Age: 59
End: 2019-01-08

## 2019-01-08 ENCOUNTER — APPOINTMENT (OUTPATIENT)
Dept: CARDIOLOGY | Facility: CLINIC | Age: 59
End: 2019-01-08
Payer: COMMERCIAL

## 2019-01-08 VITALS
HEIGHT: 70 IN | BODY MASS INDEX: 36.51 KG/M2 | SYSTOLIC BLOOD PRESSURE: 105 MMHG | WEIGHT: 255 LBS | OXYGEN SATURATION: 95 % | HEART RATE: 76 BPM | DIASTOLIC BLOOD PRESSURE: 73 MMHG

## 2019-01-08 VITALS — SYSTOLIC BLOOD PRESSURE: 122 MMHG | DIASTOLIC BLOOD PRESSURE: 78 MMHG

## 2019-01-08 DIAGNOSIS — I10 ESSENTIAL (PRIMARY) HYPERTENSION: ICD-10-CM

## 2019-01-08 PROCEDURE — 93000 ELECTROCARDIOGRAM COMPLETE: CPT

## 2019-01-08 PROCEDURE — 99214 OFFICE O/P EST MOD 30 MIN: CPT

## 2019-01-08 RX ORDER — VENLAFAXINE 37.5 MG/1
37.5 TABLET ORAL
Qty: 2 | Refills: 0 | Status: DISCONTINUED | COMMUNITY
Start: 2017-03-03 | End: 2019-01-08

## 2019-01-08 NOTE — CARDIOLOGY SUMMARY
[No Ischemia] : no Ischemia [No Symptoms] : no Symptoms [LVEF ___%] : LVEF [unfilled]% [None] : no pulmonary hypertension [Normal] : normal LA size [___] : [unfilled]

## 2019-01-08 NOTE — PHYSICAL EXAM
[General Appearance - Well Developed] : well developed [Well Groomed] : well groomed [General Appearance - Well Nourished] : well nourished [No Deformities] : no deformities [General Appearance - In No Acute Distress] : no acute distress [Normal Conjunctiva] : the conjunctiva exhibited no abnormalities [Normal Oral Mucosa] : normal oral mucosa [No Oral Pallor] : no oral pallor [No Oral Cyanosis] : no oral cyanosis [Normal Oropharynx] : normal oropharynx [Normal Jugular Venous A Waves Present] : normal jugular venous A waves present [Normal Jugular Venous V Waves Present] : normal jugular venous V waves present [Respiration, Rhythm And Depth] : normal respiratory rhythm and effort [Exaggerated Use Of Accessory Muscles For Inspiration] : no accessory muscle use [Auscultation Breath Sounds / Voice Sounds] : lungs were clear to auscultation bilaterally [Heart Rate And Rhythm] : heart rate and rhythm were normal [Heart Sounds] : normal S1 and S2 [Murmurs] : no murmurs present [Arterial Pulses Normal] : the arterial pulses were normal [Edema] : no peripheral edema present [Bowel Sounds] : normal bowel sounds [Abdomen Soft] : soft [Abnormal Walk] : normal gait [Nail Clubbing] : no clubbing of the fingernails [Cyanosis, Localized] : no localized cyanosis [Skin Color & Pigmentation] : normal skin color and pigmentation [Skin Turgor] : normal skin turgor [] : no rash [Oriented To Time, Place, And Person] : oriented to person, place, and time [Impaired Insight] : insight and judgment were intact [No Anxiety] : not feeling anxious

## 2019-01-13 NOTE — DISCUSSION/SUMMARY
[Patient] : the patient [Risks] : risks [Benefits] : benefits [___ Year(s)] : [unfilled] year(s) [With ___] : with [unfilled] [FreeTextEntry1] : Mr. Misbah Collier is a 58 year-old male with history of asthma, cervical radiculopathy, BPH and hypertension who presents for routine f/u. Reports of feeling well. Feels more tired than before. Has CALVO with over exertion. Will see his PCP soon with blood works. Is having problem with CPAP device, will f/u soon with his Pulmonologist in [place]. Is thinking of possible bariatric procedure but, definite yet. Denies chest pain, shortness of breath at rest, palpitations, syncope or near syncope, orthopnea and PND. Compliant with medications. No regular exercise but active at home and work.  No activity intolerance. No leg edema. \par \par A/P:\par \par 1. Hypertension - normotensive today, continue on Bystolic and ramipril\par 2. asthma - management per PCP\par 3. Decrease caloric intake\par 4. Increase physical activity\par 5. will f/u labs from PCP\par 6. F/U with Dr. Araujo.\par \par Galo Memoracion, NP

## 2019-01-13 NOTE — HISTORY OF PRESENT ILLNESS
[FreeTextEntry1] : Mr. Misbah Collier is a 58 year-old male with history of asthma, cervical radiculopathy, BPH and hypertension who presents for routine f/u. Reports of feeling well. Feels more tired than before. Has CALVO with over exertion. Will see his PCP soon with blood works. Is having problem with CPAP device, will f/u soon with his Pulmonologist in [place]. Is thinking of possible bariatric procedure but, definite yet. Denies chest pain, shortness of breath at rest, palpitations, syncope or near syncope, orthopnea and PND. Compliant with medications. No regular exercise but active at home and work.  No activity intolerance. No leg edema.

## 2019-01-13 NOTE — REVIEW OF SYSTEMS
[Eyeglasses] : currently wearing eyeglasses [Joint Pain] : joint pain [Feeling Fatigued] : feeling fatigued [Dyspnea on exertion] : dyspnea during exertion [Fever] : no fever [Chills] : no chills [Blurry Vision] : no blurred vision [Earache] : no earache [Mouth Sores] : no mouth sores [Sore Throat] : no sore throat [Shortness Of Breath] : no shortness of breath [Chest Pain] : no chest pain [Lower Ext Edema] : no extremity edema [Leg Claudication] : no intermittent leg claudication [Palpitations] : no palpitations [Cough] : no cough [Abdominal Pain] : no abdominal pain [Heartburn] : no heartburn [Dysphagia] : no dysphagia [Urinary Frequency] : no change in urinary frequency [Hematuria] : no hematuria [Joint Swelling] : no joint swelling [Skin: A Rash] : no rash: [Skin Lesions] : no skin lesions [Dizziness] : no dizziness [Convulsions] : no convulsions [Confusion] : no confusion was observed [Anxiety] : no anxiety [Excessive Thirst] : no polydipsia [Easy Bleeding] : no tendency for easy bleeding [Easy Bruising] : no tendency for easy bruising

## 2019-08-29 ENCOUNTER — RECORD ABSTRACTING (OUTPATIENT)
Age: 59
End: 2019-08-29

## 2019-08-29 ENCOUNTER — MEDICATION RENEWAL (OUTPATIENT)
Age: 59
End: 2019-08-29

## 2020-01-09 ENCOUNTER — APPOINTMENT (OUTPATIENT)
Dept: CARDIOLOGY | Facility: CLINIC | Age: 60
End: 2020-01-09

## 2022-07-22 ENCOUNTER — APPOINTMENT (OUTPATIENT)
Dept: PULMONOLOGY | Facility: CLINIC | Age: 62
End: 2022-07-22

## 2022-07-22 VITALS
HEART RATE: 87 BPM | DIASTOLIC BLOOD PRESSURE: 72 MMHG | OXYGEN SATURATION: 94 % | BODY MASS INDEX: 37.22 KG/M2 | RESPIRATION RATE: 16 BRPM | SYSTOLIC BLOOD PRESSURE: 128 MMHG | WEIGHT: 260 LBS | HEIGHT: 70 IN

## 2022-07-22 PROCEDURE — 99203 OFFICE O/P NEW LOW 30 MIN: CPT

## 2022-07-22 NOTE — CONSULT LETTER
[Dear  ___] : Dear  [unfilled], [Consult Letter:] : I had the pleasure of evaluating your patient, [unfilled]. [Please see my note below.] : Please see my note below. [Consult Closing:] : Thank you very much for allowing me to participate in the care of this patient.  If you have any questions, please do not hesitate to contact me. [Sincerely,] : Sincerely, [DrShannan  ___] : Dr. LICONA

## 2022-08-02 ENCOUNTER — OUTPATIENT (OUTPATIENT)
Dept: OUTPATIENT SERVICES | Facility: HOSPITAL | Age: 62
LOS: 1 days | End: 2022-08-02
Payer: COMMERCIAL

## 2022-08-02 DIAGNOSIS — Z90.49 ACQUIRED ABSENCE OF OTHER SPECIFIED PARTS OF DIGESTIVE TRACT: Chronic | ICD-10-CM

## 2022-08-02 DIAGNOSIS — G47.33 OBSTRUCTIVE SLEEP APNEA (ADULT) (PEDIATRIC): ICD-10-CM

## 2022-08-02 DIAGNOSIS — Z98.89 OTHER SPECIFIED POSTPROCEDURAL STATES: Chronic | ICD-10-CM

## 2022-08-02 DIAGNOSIS — Z98.1 ARTHRODESIS STATUS: Chronic | ICD-10-CM

## 2022-08-02 PROCEDURE — 95800 SLP STDY UNATTENDED: CPT

## 2022-08-26 NOTE — HISTORY OF PRESENT ILLNESS
[Obstructive Sleep Apnea] : obstructive sleep apnea [Awakes Unrefreshed] : awakes unrefreshed [Awakes with Dry Mouth] : awakes with dry mouth [Daytime Somnolence] : daytime somnolence [Recent  Weight Gain] : recent weight gain [Snoring] : snoring [TextBox_4] : Obese, hypertensive, male, never smoker with H/O asthma seen for SOB and DARON\par Known DARON on CPAP in the past\par 2 recalled Margarito machines - newest is 7 years old\par Never slept well with CPAP - no mask was comfortable\par Interested in [employer] \par Allergy to pets\par Has always had pets\par 2nd hand smoke as a child  [ESS] : 9

## 2022-08-26 NOTE — PHYSICAL EXAM
[No Acute Distress] : no acute distress [Elongated Uvula] : elongated uvula [Enlarged Base of the Tongue] : enlarged base of the tongue [II] : Mallampati Class: II [Normal Appearance] : normal appearance [Neck Circumference: ___] : neck circumference: [unfilled] [No Neck Mass] : no neck mass [Normal Rate/Rhythm] : normal rate/rhythm [Normal S1, S2] : normal s1, s2 [No Resp Distress] : no resp distress [Clear to Auscultation Bilaterally] : clear to auscultation bilaterally [TextBox_2] : obese

## 2022-08-30 ENCOUNTER — APPOINTMENT (OUTPATIENT)
Dept: PULMONOLOGY | Facility: CLINIC | Age: 62
End: 2022-08-30

## 2022-08-30 VITALS
HEIGHT: 70 IN | BODY MASS INDEX: 36.94 KG/M2 | RESPIRATION RATE: 16 BRPM | DIASTOLIC BLOOD PRESSURE: 82 MMHG | HEART RATE: 113 BPM | SYSTOLIC BLOOD PRESSURE: 124 MMHG | WEIGHT: 258 LBS | OXYGEN SATURATION: 95 %

## 2022-08-30 DIAGNOSIS — R06.02 SHORTNESS OF BREATH: ICD-10-CM

## 2022-08-30 PROCEDURE — 99213 OFFICE O/P EST LOW 20 MIN: CPT

## 2022-12-28 ENCOUNTER — NON-APPOINTMENT (OUTPATIENT)
Age: 62
End: 2022-12-28

## 2023-10-31 ENCOUNTER — APPOINTMENT (OUTPATIENT)
Dept: SURGERY | Facility: CLINIC | Age: 63
End: 2023-10-31
Payer: COMMERCIAL

## 2023-10-31 VITALS
HEART RATE: 79 BPM | OXYGEN SATURATION: 96 % | SYSTOLIC BLOOD PRESSURE: 155 MMHG | DIASTOLIC BLOOD PRESSURE: 95 MMHG | HEIGHT: 70 IN | RESPIRATION RATE: 17 BRPM | BODY MASS INDEX: 36.94 KG/M2 | WEIGHT: 258 LBS | TEMPERATURE: 98.2 F

## 2023-10-31 DIAGNOSIS — E66.9 OBESITY, UNSPECIFIED: ICD-10-CM

## 2023-10-31 PROCEDURE — 99204 OFFICE O/P NEW MOD 45 MIN: CPT

## 2023-10-31 RX ORDER — VENLAFAXINE 37.5 MG/1
TABLET ORAL
Refills: 0 | Status: ACTIVE | COMMUNITY

## 2023-10-31 RX ORDER — METOPROLOL SUCCINATE AND HYDROCHLOROTHIAZIDE 12.5; 1 MG/1; MG/1
TABLET ORAL
Refills: 0 | Status: ACTIVE | COMMUNITY

## 2023-11-01 PROBLEM — K21.9 GERD (GASTROESOPHAGEAL REFLUX DISEASE): Status: ACTIVE | Noted: 2023-10-31

## 2023-11-01 PROBLEM — K76.0 FATTY LIVER: Status: ACTIVE | Noted: 2023-10-31

## 2023-11-01 PROBLEM — Z01.818 PRE-OP TESTING: Status: ACTIVE | Noted: 2023-11-01 | Resolved: 2023-12-31

## 2023-11-01 PROBLEM — Z13.29 SCREENING FOR ENDOCRINE, NUTRITIONAL, METABOLIC AND IMMUNITY DISORDER: Status: ACTIVE | Noted: 2023-11-01

## 2023-11-02 ENCOUNTER — APPOINTMENT (OUTPATIENT)
Dept: SURGERY | Facility: CLINIC | Age: 63
End: 2023-11-02
Payer: COMMERCIAL

## 2023-11-02 VITALS
SYSTOLIC BLOOD PRESSURE: 153 MMHG | WEIGHT: 260 LBS | BODY MASS INDEX: 37.22 KG/M2 | OXYGEN SATURATION: 97 % | RESPIRATION RATE: 18 BRPM | DIASTOLIC BLOOD PRESSURE: 93 MMHG | HEIGHT: 70 IN | TEMPERATURE: 97.8 F | HEART RATE: 82 BPM

## 2023-11-02 DIAGNOSIS — Z13.228 ENCOUNTER FOR SCREENING FOR OTHER SUSPECTED ENDOCRINE DISORDER: ICD-10-CM

## 2023-11-02 DIAGNOSIS — Z13.21 ENCOUNTER FOR SCREENING FOR OTHER SUSPECTED ENDOCRINE DISORDER: ICD-10-CM

## 2023-11-02 DIAGNOSIS — Z13.0 ENCOUNTER FOR SCREENING FOR OTHER SUSPECTED ENDOCRINE DISORDER: ICD-10-CM

## 2023-11-02 DIAGNOSIS — K76.0 FATTY (CHANGE OF) LIVER, NOT ELSEWHERE CLASSIFIED: ICD-10-CM

## 2023-11-02 DIAGNOSIS — K21.9 GASTRO-ESOPHAGEAL REFLUX DISEASE W/OUT ESOPHAGITIS: ICD-10-CM

## 2023-11-02 DIAGNOSIS — Z13.29 ENCOUNTER FOR SCREENING FOR OTHER SUSPECTED ENDOCRINE DISORDER: ICD-10-CM

## 2023-11-02 DIAGNOSIS — Z01.818 ENCOUNTER FOR OTHER PREPROCEDURAL EXAMINATION: ICD-10-CM

## 2023-11-02 PROCEDURE — 99205 OFFICE O/P NEW HI 60 MIN: CPT

## 2023-11-16 ENCOUNTER — OUTPATIENT (OUTPATIENT)
Dept: OUTPATIENT SERVICES | Facility: HOSPITAL | Age: 63
LOS: 1 days | End: 2023-11-16
Payer: COMMERCIAL

## 2023-11-16 DIAGNOSIS — Z98.1 ARTHRODESIS STATUS: Chronic | ICD-10-CM

## 2023-11-16 DIAGNOSIS — Z13.29 ENCOUNTER FOR SCREENING FOR OTHER SUSPECTED ENDOCRINE DISORDER: ICD-10-CM

## 2023-11-16 DIAGNOSIS — Z90.49 ACQUIRED ABSENCE OF OTHER SPECIFIED PARTS OF DIGESTIVE TRACT: Chronic | ICD-10-CM

## 2023-11-16 DIAGNOSIS — Z01.818 ENCOUNTER FOR OTHER PREPROCEDURAL EXAMINATION: ICD-10-CM

## 2023-11-16 DIAGNOSIS — Z98.89 OTHER SPECIFIED POSTPROCEDURAL STATES: Chronic | ICD-10-CM

## 2023-11-16 DIAGNOSIS — E66.01 MORBID (SEVERE) OBESITY DUE TO EXCESS CALORIES: ICD-10-CM

## 2023-11-16 PROCEDURE — 74246 X-RAY XM UPR GI TRC 2CNTRST: CPT

## 2023-11-16 PROCEDURE — 74246 X-RAY XM UPR GI TRC 2CNTRST: CPT | Mod: 26

## 2023-11-21 ENCOUNTER — LABORATORY RESULT (OUTPATIENT)
Age: 63
End: 2023-11-21

## 2023-11-29 LAB
24R-OH-CALCIDIOL SERPL-MCNC: 47.2 PG/ML
A-TOCOPHEROL VIT E SERPL-MCNC: 8.9 MG/L
ALBUMIN SERPL ELPH-MCNC: 4.2 G/DL
ALP BLD-CCNC: 104 U/L
ALT SERPL-CCNC: 91 U/L
ANION GAP SERPL CALC-SCNC: 11 MMOL/L
APTT BLD: 30.6 SEC
AST SERPL-CCNC: 50 U/L
BASOPHILS # BLD AUTO: 0.08 K/UL
BASOPHILS NFR BLD AUTO: 0.9 %
BETA+GAMMA TOCOPHEROL SERPL-MCNC: 1.4 MG/L
BILIRUB SERPL-MCNC: 0.6 MG/DL
BUN SERPL-MCNC: 10 MG/DL
CALCIUM SERPL-MCNC: 9.8 MG/DL
CHLORIDE SERPL-SCNC: 102 MMOL/L
CO2 SERPL-SCNC: 23 MMOL/L
CREAT SERPL-MCNC: 0.89 MG/DL
EGFR: 96 ML/MIN/1.73M2
EOSINOPHIL # BLD AUTO: 0.21 K/UL
EOSINOPHIL NFR BLD AUTO: 2.5 %
ESTIMATED AVERAGE GLUCOSE: 108 MG/DL
FOLATE SERPL-MCNC: 7.7 NG/ML
GLUCOSE SERPL-MCNC: 154 MG/DL
HBA1C MFR BLD HPLC: 5.4 %
HCT VFR BLD CALC: 46.6 %
HGB BLD-MCNC: 14.7 G/DL
IMM GRANULOCYTES NFR BLD AUTO: 0.6 %
INR PPP: 0.95 RATIO
IRON SATN MFR SERPL: 27 %
IRON SERPL-MCNC: 83 UG/DL
LYMPHOCYTES # BLD AUTO: 2.38 K/UL
LYMPHOCYTES NFR BLD AUTO: 28.1 %
MAN DIFF?: NORMAL
MCHC RBC-ENTMCNC: 30.1 PG
MCHC RBC-ENTMCNC: 31.5 GM/DL
MCV RBC AUTO: 95.5 FL
MENADIONE SERPL-MCNC: 0.59 NG/ML
MONOCYTES # BLD AUTO: 0.52 K/UL
MONOCYTES NFR BLD AUTO: 6.1 %
NEUTROPHILS # BLD AUTO: 5.24 K/UL
NEUTROPHILS NFR BLD AUTO: 61.8 %
PLATELET # BLD AUTO: 293 K/UL
POTASSIUM SERPL-SCNC: 4.8 MMOL/L
PROT SERPL-MCNC: 7.1 G/DL
PSA FREE FLD-MCNC: 34 %
PSA FREE SERPL-MCNC: 0.23 NG/ML
PSA SERPL-MCNC: 0.68 NG/ML
PT BLD: 10.7 SEC
RBC # BLD: 4.88 M/UL
RBC # FLD: 12.5 %
SODIUM SERPL-SCNC: 136 MMOL/L
TIBC SERPL-MCNC: 306 UG/DL
TSH SERPL-ACNC: 1.17 UIU/ML
UIBC SERPL-MCNC: 223 UG/DL
VIT A SERPL-MCNC: 35.2 UG/DL
VIT B1 SERPL-MCNC: 133.1 NMOL/L
VIT B12 SERPL-MCNC: 462 PG/ML
VIT B6 SERPL-MCNC: 5.5 UG/L
WBC # FLD AUTO: 8.48 K/UL

## 2023-12-06 LAB
COPPER SERPL-MCNC: 119 UG/DL
ZINC SERPL-MCNC: 93 UG/DL

## 2024-01-04 ENCOUNTER — RESULT CHARGE (OUTPATIENT)
Age: 64
End: 2024-01-04

## 2024-01-05 ENCOUNTER — APPOINTMENT (OUTPATIENT)
Dept: PULMONOLOGY | Facility: CLINIC | Age: 64
End: 2024-01-05
Payer: COMMERCIAL

## 2024-01-05 VITALS
HEART RATE: 69 BPM | RESPIRATION RATE: 16 BRPM | DIASTOLIC BLOOD PRESSURE: 88 MMHG | SYSTOLIC BLOOD PRESSURE: 122 MMHG | OXYGEN SATURATION: 95 %

## 2024-01-05 VITALS — WEIGHT: 262 LBS | HEIGHT: 69 IN | BODY MASS INDEX: 38.8 KG/M2

## 2024-01-05 DIAGNOSIS — Z01.811 ENCOUNTER FOR PREPROCEDURAL RESPIRATORY EXAMINATION: ICD-10-CM

## 2024-01-05 DIAGNOSIS — G47.33 OBSTRUCTIVE SLEEP APNEA (ADULT) (PEDIATRIC): ICD-10-CM

## 2024-01-05 DIAGNOSIS — J45.909 UNSPECIFIED ASTHMA, UNCOMPLICATED: ICD-10-CM

## 2024-01-05 PROCEDURE — 99213 OFFICE O/P EST LOW 20 MIN: CPT | Mod: 25

## 2024-01-05 PROCEDURE — 94010 BREATHING CAPACITY TEST: CPT

## 2024-01-05 NOTE — DISCUSSION/SUMMARY
[FreeTextEntry1] : Assess:  Obesity  Trivial Asthma - prn albuterol sufficient Moderate DARON  Compliant with and benefiting from nocturnal CPAP Therapeutic CPAP=12 cm H20 No significant pulmonary or sleep medicine related contraindication to bariatric surgery under general anesthesia  Plan  APAP or CPAP at 12 cm H20 perioperatively 6 month FU

## 2024-01-05 NOTE — HISTORY OF PRESENT ILLNESS
[Obstructive Sleep Apnea] : obstructive sleep apnea [Awakes Unrefreshed] : awakes unrefreshed [Awakes with Dry Mouth] : awakes with dry mouth [Daytime Somnolence] : daytime somnolence [Recent  Weight Gain] : recent weight gain [Snoring] : snoring [TextBox_4] : Obese, hypertensive, male, never smoker with H/O asthma seen for SOB and DARON Known DARON on CPAP in the past 2 recalled Margarito machines - newest is 7 years old Never slept well with CPAP - no mask was comfortable Interested in [employer]  Allergy to pets Has always had pets 2nd hand smoke as a child   1/5/24 63 year-old morbidly obese man, 5 feet 10 inches, 260 lbs. (BMI=37.3 ). The patient presents requesting sleep and pulmonary clearance for weight loss surgery. He has been more than 100 lbs. overweight for the past 10 years and is currently 5 lbs less than his greatest weight. Misbah has lost up to 5-10 lbs on more than one occasion.   The patient has tried numerous weight loss programs, low carb diets & self-directed and physician supervised diets. Misbah has not taken weight loss medications due to unavailability and known side effects  He denies diabetes, has shortness of breath with exertion, has weight bearing joint pain, denies lower back pain.  He hs mild seasonal asthma, diagnosed DARON, uses Cpap, he has difficulty sleeping. He denies kidney, incontinence, has BPH. He denies erectile dysfunction. He denies headache, dizziness, seizure or neurological disorders.  He denies untreated thyroid, adrenal, pituitary disease. The patient has h/o depression, denies psychiatric disorders. The is s/p cholecystectomy, has occasional heartburn, denies ulcers. Reports recent elevated liver function test.  He has high blood pressure, denies high cholesterol, denies history of heart attack or stroke. He denies anemia, denies bleeding disorders, thrombosis, clotting disorder or easy bruisability. The patient has peripheral edema.  Obesity related co morbidities - Hypertension, hyperlipidemia, GERD, sleep apnea, joint pain.  Compliant with and benefiting from nocturnal CPAP [ESS] : 9

## 2024-01-05 NOTE — RESULTS/DATA
[TextEntry] : 1/5/24: Cesar with Mild obesity related restriction.  No obstruction. FEV1=1.89 liters 1/5/24: Derek data on his cell phone shows excellent compliance with nasal APAP: Therapeutic pressure=12 cm H20

## 2024-02-08 ENCOUNTER — NON-APPOINTMENT (OUTPATIENT)
Age: 64
End: 2024-02-08

## 2024-02-15 ENCOUNTER — APPOINTMENT (OUTPATIENT)
Dept: SURGERY | Facility: CLINIC | Age: 64
End: 2024-02-15
Payer: COMMERCIAL

## 2024-02-15 VITALS
WEIGHT: 251 LBS | TEMPERATURE: 96.7 F | RESPIRATION RATE: 17 BRPM | HEIGHT: 69 IN | OXYGEN SATURATION: 97 % | BODY MASS INDEX: 37.18 KG/M2 | HEART RATE: 74 BPM | DIASTOLIC BLOOD PRESSURE: 89 MMHG | SYSTOLIC BLOOD PRESSURE: 134 MMHG

## 2024-02-15 DIAGNOSIS — E66.01 MORBID (SEVERE) OBESITY DUE TO EXCESS CALORIES: ICD-10-CM

## 2024-02-15 PROCEDURE — 99215 OFFICE O/P EST HI 40 MIN: CPT

## 2024-02-20 ENCOUNTER — OUTPATIENT (OUTPATIENT)
Dept: OUTPATIENT SERVICES | Facility: HOSPITAL | Age: 64
LOS: 1 days | End: 2024-02-20

## 2024-02-20 VITALS
OXYGEN SATURATION: 98 % | TEMPERATURE: 98 F | RESPIRATION RATE: 16 BRPM | HEART RATE: 78 BPM | HEIGHT: 70 IN | SYSTOLIC BLOOD PRESSURE: 104 MMHG | DIASTOLIC BLOOD PRESSURE: 78 MMHG | WEIGHT: 250 LBS

## 2024-02-20 DIAGNOSIS — Z90.49 ACQUIRED ABSENCE OF OTHER SPECIFIED PARTS OF DIGESTIVE TRACT: Chronic | ICD-10-CM

## 2024-02-20 DIAGNOSIS — Z98.89 OTHER SPECIFIED POSTPROCEDURAL STATES: Chronic | ICD-10-CM

## 2024-02-20 DIAGNOSIS — E66.01 MORBID (SEVERE) OBESITY DUE TO EXCESS CALORIES: ICD-10-CM

## 2024-02-20 DIAGNOSIS — G47.33 OBSTRUCTIVE SLEEP APNEA (ADULT) (PEDIATRIC): ICD-10-CM

## 2024-02-20 DIAGNOSIS — I10 ESSENTIAL (PRIMARY) HYPERTENSION: ICD-10-CM

## 2024-02-20 DIAGNOSIS — Z98.1 ARTHRODESIS STATUS: Chronic | ICD-10-CM

## 2024-02-20 LAB
A1C WITH ESTIMATED AVERAGE GLUCOSE RESULT: 5.3 % — SIGNIFICANT CHANGE UP (ref 4–5.6)
ALBUMIN SERPL ELPH-MCNC: 4 G/DL — SIGNIFICANT CHANGE UP (ref 3.3–5)
ALP SERPL-CCNC: 74 U/L — SIGNIFICANT CHANGE UP (ref 40–120)
ALT FLD-CCNC: 57 U/L — HIGH (ref 4–41)
ANION GAP SERPL CALC-SCNC: 11 MMOL/L — SIGNIFICANT CHANGE UP (ref 7–14)
AST SERPL-CCNC: 27 U/L — SIGNIFICANT CHANGE UP (ref 4–40)
BILIRUB SERPL-MCNC: 0.8 MG/DL — SIGNIFICANT CHANGE UP (ref 0.2–1.2)
BLD GP AB SCN SERPL QL: NEGATIVE — SIGNIFICANT CHANGE UP
BUN SERPL-MCNC: 17 MG/DL — SIGNIFICANT CHANGE UP (ref 7–23)
CALCIUM SERPL-MCNC: 9.5 MG/DL — SIGNIFICANT CHANGE UP (ref 8.4–10.5)
CHLORIDE SERPL-SCNC: 103 MMOL/L — SIGNIFICANT CHANGE UP (ref 98–107)
CO2 SERPL-SCNC: 24 MMOL/L — SIGNIFICANT CHANGE UP (ref 22–31)
CREAT SERPL-MCNC: 0.84 MG/DL — SIGNIFICANT CHANGE UP (ref 0.5–1.3)
EGFR: 98 ML/MIN/1.73M2 — SIGNIFICANT CHANGE UP
ESTIMATED AVERAGE GLUCOSE: 105 — SIGNIFICANT CHANGE UP
GLUCOSE SERPL-MCNC: 94 MG/DL — SIGNIFICANT CHANGE UP (ref 70–99)
HCT VFR BLD CALC: 43.1 % — SIGNIFICANT CHANGE UP (ref 39–50)
HGB BLD-MCNC: 14.2 G/DL — SIGNIFICANT CHANGE UP (ref 13–17)
MCHC RBC-ENTMCNC: 29.3 PG — SIGNIFICANT CHANGE UP (ref 27–34)
MCHC RBC-ENTMCNC: 32.9 GM/DL — SIGNIFICANT CHANGE UP (ref 32–36)
MCV RBC AUTO: 89 FL — SIGNIFICANT CHANGE UP (ref 80–100)
NRBC # BLD: 0 /100 WBCS — SIGNIFICANT CHANGE UP (ref 0–0)
NRBC # FLD: 0 K/UL — SIGNIFICANT CHANGE UP (ref 0–0)
PLATELET # BLD AUTO: 304 K/UL — SIGNIFICANT CHANGE UP (ref 150–400)
POTASSIUM SERPL-MCNC: 4.2 MMOL/L — SIGNIFICANT CHANGE UP (ref 3.5–5.3)
POTASSIUM SERPL-SCNC: 4.2 MMOL/L — SIGNIFICANT CHANGE UP (ref 3.5–5.3)
PROT SERPL-MCNC: 7 G/DL — SIGNIFICANT CHANGE UP (ref 6–8.3)
RBC # BLD: 4.84 M/UL — SIGNIFICANT CHANGE UP (ref 4.2–5.8)
RBC # FLD: 13.2 % — SIGNIFICANT CHANGE UP (ref 10.3–14.5)
RH IG SCN BLD-IMP: POSITIVE — SIGNIFICANT CHANGE UP
RH IG SCN BLD-IMP: POSITIVE — SIGNIFICANT CHANGE UP
SODIUM SERPL-SCNC: 138 MMOL/L — SIGNIFICANT CHANGE UP (ref 135–145)
WBC # BLD: 8.44 K/UL — SIGNIFICANT CHANGE UP (ref 3.8–10.5)
WBC # FLD AUTO: 8.44 K/UL — SIGNIFICANT CHANGE UP (ref 3.8–10.5)

## 2024-02-20 RX ORDER — NEBIVOLOL HYDROCHLORIDE 5 MG/1
0 TABLET ORAL
Qty: 0 | Refills: 0 | DISCHARGE

## 2024-02-20 RX ORDER — RAMIPRIL 5 MG
0 CAPSULE ORAL
Qty: 0 | Refills: 0 | DISCHARGE

## 2024-02-20 RX ORDER — DILTIAZEM HCL 120 MG
1 CAPSULE, EXT RELEASE 24 HR ORAL
Qty: 0 | Refills: 0 | DISCHARGE

## 2024-02-20 RX ORDER — FLUTICASONE PROPIONATE AND SALMETEROL 50; 250 UG/1; UG/1
0 POWDER ORAL; RESPIRATORY (INHALATION)
Qty: 0 | Refills: 0 | DISCHARGE

## 2024-02-20 RX ORDER — VENLAFAXINE HCL 75 MG
1 CAPSULE, EXT RELEASE 24 HR ORAL
Qty: 0 | Refills: 0 | DISCHARGE

## 2024-02-20 RX ORDER — GABAPENTIN 400 MG/1
0 CAPSULE ORAL
Qty: 0 | Refills: 0 | DISCHARGE

## 2024-02-20 NOTE — H&P PST ADULT - PROBLEM SELECTOR PLAN 2
patient instructed to continue antihypertensive medication keith-op  patient is scheduled to be evaluated by his cardiologist 3/2/17 Patient instructed to take metoprolol with a sip of water on the morning of procedure.  Requesting cardiologist evaluation with  recent EKG , echo and stress reports.

## 2024-02-20 NOTE — H&P PST ADULT - NSICDXPASTSURGICALHX_GEN_ALL_CORE_FT
PAST SURGICAL HISTORY:  History of cholecystectomy     S/P ACL reconstruction Left Knee    S/P ankle fusion otilia bardales

## 2024-02-20 NOTE — H&P PST ADULT - OTHER CARE PROVIDERS
Dr Jeana Pisano  # [phone] , Dr. Jennifer Montero [phone] - GI , Dr.Dr.Gary Aragon - Pulmonologist ,  Psychological services [phone] Dr Jeana Pisano  # [phone] Cardiologist , Dr. Jennifer Montero [phone] - GI ,Dr.Gary Aragon [phone]- Pulmonologist ,  Psychological services [phone]

## 2024-02-20 NOTE — H&P PST ADULT - HISTORY OF PRESENT ILLNESS
56 year old male with HTN, c/o numbness and pain radiating to right upper extremity, scheduled for C5-6 anterior cervical discectomy and fusion for radiculopathy, cervical region. 63 year old male with hx of HTN , HLD , DARON on CPAP , s/p C5-6 anterior cervical discectomy and fusion 2017 presents to PST with pre op dx of morbid or severe obesity due to excess calories is scheduled for robot vertical sleeve gastrectomy.

## 2024-02-20 NOTE — H&P PST ADULT - MUSCULOSKELETAL
details… ROM intact ROM intact/no calf tenderness/normal gait/strength 5/5 bilateral upper extremities/strength 5/5 bilateral lower extremities/extremities exam

## 2024-02-20 NOTE — H&P PST ADULT - PROBLEM SELECTOR PLAN 3
OR booking notified DARON precautions.  copy of pulmonary evaluation in chart ; recommended APAP or CPAP at 12 cm H20 perioperatively

## 2024-02-20 NOTE — H&P PST ADULT - NSICDXPASTMEDICALHX_GEN_ALL_CORE_FT
PAST MEDICAL HISTORY:  BPH (benign prostatic hypertrophy)     H/O sleep apnea uses cpap    HTN (hypertension)     Mild intermittent asthma without complication no h/o intubation for asthma    Morbid or severe obesity due to excess calories     Stenosis of cervical spine C6

## 2024-02-20 NOTE — H&P PST ADULT - NEGATIVE ENDOCRINE SYMPTOMS
Celestine sanders RN advised of below. She voiced understanding.
Halima Benjamin with  calling us back stating EKG is ok for patient to have surgery, but patient is on Coumadin, she's asking is someone telling the patient when to stop Coumadin pre-operatively? ? I told her I wasn't sure about this.
Patient have been told by her eye doctor that she does not need to stop the Coumadin. We need to get just a note from Dr. Ana Lin that from his standpoint she is okay to go for the surgery .  When we receive a note we will fax everything to patient's surgeo
no cold intolerance/no heat intolerance

## 2024-02-20 NOTE — H&P PST ADULT - PROBLEM SELECTOR PLAN 1
scheduled for C5-6 anterior cervical discectomy and fusion Patient tentatively scheduled for robot vertical sleeve gastrectomy on 02/26/2024  Pre-op instructions provided. Pt given verbal and written instructions with teach back on chlorhexidine wash. Pt verbalized understanding with return demonstration.  cbc, cmp, hba1c, t/s results pending.  copy of GI evaluation in chart; as per GI notes recommended acid reflux diet and pt cleared for bariatric surgery . Patient tentatively scheduled for robot vertical sleeve gastrectomy on 02/26/2024  Pre-op instructions provided. Pt given verbal and written instructions with teach back on chlorhexidine wash. Pt verbalized understanding with return demonstration.  cbc, cmp, hba1c, t/s results pending.  copy of GI evaluation in chart; as per GI notes recommended acid reflux diet and pt cleared for bariatric surgery .  Email sent to Marina Samuel and Maria Esther Lawson about Emend administration.

## 2024-02-25 ENCOUNTER — TRANSCRIPTION ENCOUNTER (OUTPATIENT)
Age: 64
End: 2024-02-25

## 2024-02-26 ENCOUNTER — RESULT REVIEW (OUTPATIENT)
Age: 64
End: 2024-02-26

## 2024-02-26 ENCOUNTER — INPATIENT (INPATIENT)
Facility: HOSPITAL | Age: 64
LOS: 0 days | Discharge: HOME | End: 2024-02-27
Attending: SURGERY | Admitting: SURGERY
Payer: COMMERCIAL

## 2024-02-26 ENCOUNTER — APPOINTMENT (OUTPATIENT)
Dept: SURGERY | Facility: HOSPITAL | Age: 64
End: 2024-02-26
Payer: COMMERCIAL

## 2024-02-26 VITALS
TEMPERATURE: 98 F | SYSTOLIC BLOOD PRESSURE: 111 MMHG | HEART RATE: 83 BPM | OXYGEN SATURATION: 96 % | RESPIRATION RATE: 18 BRPM | HEIGHT: 70 IN | DIASTOLIC BLOOD PRESSURE: 74 MMHG | WEIGHT: 244.93 LBS

## 2024-02-26 DIAGNOSIS — E66.01 MORBID (SEVERE) OBESITY DUE TO EXCESS CALORIES: ICD-10-CM

## 2024-02-26 DIAGNOSIS — Z90.49 ACQUIRED ABSENCE OF OTHER SPECIFIED PARTS OF DIGESTIVE TRACT: Chronic | ICD-10-CM

## 2024-02-26 DIAGNOSIS — Z98.89 OTHER SPECIFIED POSTPROCEDURAL STATES: Chronic | ICD-10-CM

## 2024-02-26 DIAGNOSIS — Z98.1 ARTHRODESIS STATUS: Chronic | ICD-10-CM

## 2024-02-26 LAB
ANION GAP SERPL CALC-SCNC: 11 MMOL/L — SIGNIFICANT CHANGE UP (ref 7–14)
BASOPHILS # BLD AUTO: 0.03 K/UL — SIGNIFICANT CHANGE UP (ref 0–0.2)
BASOPHILS NFR BLD AUTO: 0.2 % — SIGNIFICANT CHANGE UP (ref 0–2)
BUN SERPL-MCNC: 13 MG/DL — SIGNIFICANT CHANGE UP (ref 7–23)
CALCIUM SERPL-MCNC: 9 MG/DL — SIGNIFICANT CHANGE UP (ref 8.4–10.5)
CHLORIDE SERPL-SCNC: 100 MMOL/L — SIGNIFICANT CHANGE UP (ref 98–107)
CO2 SERPL-SCNC: 23 MMOL/L — SIGNIFICANT CHANGE UP (ref 22–31)
CREAT SERPL-MCNC: 0.82 MG/DL — SIGNIFICANT CHANGE UP (ref 0.5–1.3)
EGFR: 99 ML/MIN/1.73M2 — SIGNIFICANT CHANGE UP
EOSINOPHIL # BLD AUTO: 0.1 K/UL — SIGNIFICANT CHANGE UP (ref 0–0.5)
EOSINOPHIL NFR BLD AUTO: 0.7 % — SIGNIFICANT CHANGE UP (ref 0–6)
GLUCOSE BLDC GLUCOMTR-MCNC: 111 MG/DL — HIGH (ref 70–99)
GLUCOSE BLDC GLUCOMTR-MCNC: 124 MG/DL — HIGH (ref 70–99)
GLUCOSE BLDC GLUCOMTR-MCNC: 142 MG/DL — HIGH (ref 70–99)
GLUCOSE BLDC GLUCOMTR-MCNC: 152 MG/DL — HIGH (ref 70–99)
GLUCOSE BLDC GLUCOMTR-MCNC: 91 MG/DL — SIGNIFICANT CHANGE UP (ref 70–99)
GLUCOSE SERPL-MCNC: 122 MG/DL — HIGH (ref 70–99)
HCT VFR BLD CALC: 41.4 % — SIGNIFICANT CHANGE UP (ref 39–50)
HGB BLD-MCNC: 13.4 G/DL — SIGNIFICANT CHANGE UP (ref 13–17)
IANC: 11.26 K/UL — HIGH (ref 1.8–7.4)
IMM GRANULOCYTES NFR BLD AUTO: 0.6 % — SIGNIFICANT CHANGE UP (ref 0–0.9)
LYMPHOCYTES # BLD AUTO: 1.81 K/UL — SIGNIFICANT CHANGE UP (ref 1–3.3)
LYMPHOCYTES # BLD AUTO: 13.1 % — SIGNIFICANT CHANGE UP (ref 13–44)
MAGNESIUM SERPL-MCNC: 2 MG/DL — SIGNIFICANT CHANGE UP (ref 1.6–2.6)
MCHC RBC-ENTMCNC: 29.5 PG — SIGNIFICANT CHANGE UP (ref 27–34)
MCHC RBC-ENTMCNC: 32.4 GM/DL — SIGNIFICANT CHANGE UP (ref 32–36)
MCV RBC AUTO: 91 FL — SIGNIFICANT CHANGE UP (ref 80–100)
MONOCYTES # BLD AUTO: 0.58 K/UL — SIGNIFICANT CHANGE UP (ref 0–0.9)
MONOCYTES NFR BLD AUTO: 4.2 % — SIGNIFICANT CHANGE UP (ref 2–14)
NEUTROPHILS # BLD AUTO: 11.26 K/UL — HIGH (ref 1.8–7.4)
NEUTROPHILS NFR BLD AUTO: 81.2 % — HIGH (ref 43–77)
NRBC # BLD: 0 /100 WBCS — SIGNIFICANT CHANGE UP (ref 0–0)
NRBC # FLD: 0 K/UL — SIGNIFICANT CHANGE UP (ref 0–0)
PHOSPHATE SERPL-MCNC: 3.2 MG/DL — SIGNIFICANT CHANGE UP (ref 2.5–4.5)
PLATELET # BLD AUTO: 263 K/UL — SIGNIFICANT CHANGE UP (ref 150–400)
POTASSIUM SERPL-MCNC: 4.6 MMOL/L — SIGNIFICANT CHANGE UP (ref 3.5–5.3)
POTASSIUM SERPL-SCNC: 4.6 MMOL/L — SIGNIFICANT CHANGE UP (ref 3.5–5.3)
RBC # BLD: 4.55 M/UL — SIGNIFICANT CHANGE UP (ref 4.2–5.8)
RBC # FLD: 13.2 % — SIGNIFICANT CHANGE UP (ref 10.3–14.5)
SODIUM SERPL-SCNC: 134 MMOL/L — LOW (ref 135–145)
WBC # BLD: 13.86 K/UL — HIGH (ref 3.8–10.5)
WBC # FLD AUTO: 13.86 K/UL — HIGH (ref 3.8–10.5)

## 2024-02-26 PROCEDURE — 88307 TISSUE EXAM BY PATHOLOGIST: CPT | Mod: 26

## 2024-02-26 PROCEDURE — S2900 ROBOTIC SURGICAL SYSTEM: CPT | Mod: NC

## 2024-02-26 PROCEDURE — 43775 LAP SLEEVE GASTRECTOMY: CPT

## 2024-02-26 DEVICE — STAPLER COVIDIEN TRI-STAPLE 45MM BLACK RELOAD: Type: IMPLANTABLE DEVICE | Status: FUNCTIONAL

## 2024-02-26 DEVICE — VISTASEAL FIBRIN HUMAN 10ML: Type: IMPLANTABLE DEVICE | Status: FUNCTIONAL

## 2024-02-26 DEVICE — STAPLER COVIDIEN TRI-STAPLE 60MM PURPLE RELOAD: Type: IMPLANTABLE DEVICE | Status: FUNCTIONAL

## 2024-02-26 RX ORDER — ONDANSETRON 8 MG/1
4 TABLET, FILM COATED ORAL EVERY 6 HOURS
Refills: 0 | Status: DISCONTINUED | OUTPATIENT
Start: 2024-02-26 | End: 2024-02-27

## 2024-02-26 RX ORDER — HYOSCYAMINE SULFATE 0.13 MG
1 TABLET ORAL
Qty: 28 | Refills: 0
Start: 2024-02-26 | End: 2024-03-03

## 2024-02-26 RX ORDER — CEFAZOLIN SODIUM 1 G
2000 VIAL (EA) INJECTION ONCE
Refills: 0 | Status: COMPLETED | OUTPATIENT
Start: 2024-02-26 | End: 2024-02-26

## 2024-02-26 RX ORDER — HYOSCYAMINE SULFATE 0.13 MG
0.12 TABLET ORAL EVERY 4 HOURS
Refills: 0 | Status: DISCONTINUED | OUTPATIENT
Start: 2024-02-26 | End: 2024-02-27

## 2024-02-26 RX ORDER — ACETAMINOPHEN 500 MG
1000 TABLET ORAL ONCE
Refills: 0 | Status: COMPLETED | OUTPATIENT
Start: 2024-02-27 | End: 2024-02-27

## 2024-02-26 RX ORDER — VENLAFAXINE HCL 75 MG
1 CAPSULE, EXT RELEASE 24 HR ORAL
Refills: 0 | DISCHARGE

## 2024-02-26 RX ORDER — ACETAMINOPHEN 500 MG
1000 TABLET ORAL ONCE
Refills: 0 | Status: COMPLETED | OUTPATIENT
Start: 2024-02-26 | End: 2024-02-26

## 2024-02-26 RX ORDER — VENLAFAXINE HCL 75 MG
25 CAPSULE, EXT RELEASE 24 HR ORAL DAILY
Refills: 0 | Status: DISCONTINUED | OUTPATIENT
Start: 2024-02-26 | End: 2024-02-27

## 2024-02-26 RX ORDER — SODIUM CHLORIDE 9 MG/ML
1000 INJECTION, SOLUTION INTRAVENOUS
Refills: 0 | Status: DISCONTINUED | OUTPATIENT
Start: 2024-02-26 | End: 2024-02-27

## 2024-02-26 RX ORDER — ONDANSETRON 8 MG/1
1 TABLET, FILM COATED ORAL
Qty: 3 | Refills: 0
Start: 2024-02-26 | End: 2024-03-03

## 2024-02-26 RX ORDER — SIMETHICONE 80 MG/1
80 TABLET, CHEWABLE ORAL EVERY 6 HOURS
Refills: 0 | Status: DISCONTINUED | OUTPATIENT
Start: 2024-02-26 | End: 2024-02-27

## 2024-02-26 RX ORDER — FOSAPREPITANT DIMEGLUMINE 150 MG/5ML
150 INJECTION, POWDER, LYOPHILIZED, FOR SOLUTION INTRAVENOUS ONCE
Refills: 0 | Status: COMPLETED | OUTPATIENT
Start: 2024-02-26 | End: 2024-02-26

## 2024-02-26 RX ORDER — PANTOPRAZOLE SODIUM 20 MG/1
40 TABLET, DELAYED RELEASE ORAL EVERY 24 HOURS
Refills: 0 | Status: DISCONTINUED | OUTPATIENT
Start: 2024-02-26 | End: 2024-02-27

## 2024-02-26 RX ORDER — KETOROLAC TROMETHAMINE 30 MG/ML
30 SYRINGE (ML) INJECTION EVERY 6 HOURS
Refills: 0 | Status: DISCONTINUED | OUTPATIENT
Start: 2024-02-26 | End: 2024-02-27

## 2024-02-26 RX ORDER — TAMSULOSIN HYDROCHLORIDE 0.4 MG/1
1 CAPSULE ORAL
Refills: 0 | DISCHARGE

## 2024-02-26 RX ORDER — SODIUM CHLORIDE 9 MG/ML
1000 INJECTION, SOLUTION INTRAVENOUS
Refills: 0 | Status: DISCONTINUED | OUTPATIENT
Start: 2024-02-26 | End: 2024-02-26

## 2024-02-26 RX ORDER — HEPARIN SODIUM 5000 [USP'U]/ML
5000 INJECTION INTRAVENOUS; SUBCUTANEOUS EVERY 8 HOURS
Refills: 0 | Status: DISCONTINUED | OUTPATIENT
Start: 2024-02-26 | End: 2024-02-27

## 2024-02-26 RX ORDER — ONDANSETRON 8 MG/1
4 TABLET, FILM COATED ORAL ONCE
Refills: 0 | Status: DISCONTINUED | OUTPATIENT
Start: 2024-02-26 | End: 2024-02-26

## 2024-02-26 RX ORDER — INFLUENZA VIRUS VACCINE 15; 15; 15; 15 UG/.5ML; UG/.5ML; UG/.5ML; UG/.5ML
0.5 SUSPENSION INTRAMUSCULAR ONCE
Refills: 0 | Status: DISCONTINUED | OUTPATIENT
Start: 2024-02-26 | End: 2024-02-27

## 2024-02-26 RX ORDER — OMEPRAZOLE 10 MG/1
1 CAPSULE, DELAYED RELEASE ORAL
Qty: 30 | Refills: 0
Start: 2024-02-26 | End: 2024-03-26

## 2024-02-26 RX ORDER — HEPARIN SODIUM 5000 [USP'U]/ML
5000 INJECTION INTRAVENOUS; SUBCUTANEOUS ONCE
Refills: 0 | Status: COMPLETED | OUTPATIENT
Start: 2024-02-26 | End: 2024-02-26

## 2024-02-26 RX ORDER — ACETAMINOPHEN 500 MG
15 TABLET ORAL
Qty: 300 | Refills: 0
Start: 2024-02-26 | End: 2024-03-01

## 2024-02-26 RX ORDER — SODIUM CHLORIDE 9 MG/ML
1000 INJECTION, SOLUTION INTRAVENOUS
Refills: 0 | Status: COMPLETED | OUTPATIENT
Start: 2024-02-26 | End: 2024-02-26

## 2024-02-26 RX ORDER — TAMSULOSIN HYDROCHLORIDE 0.4 MG/1
0.4 CAPSULE ORAL AT BEDTIME
Refills: 0 | Status: DISCONTINUED | OUTPATIENT
Start: 2024-02-26 | End: 2024-02-26

## 2024-02-26 RX ORDER — METOPROLOL TARTRATE 50 MG
1 TABLET ORAL
Qty: 60 | Refills: 0
Start: 2024-02-26 | End: 2024-03-26

## 2024-02-26 RX ORDER — METOPROLOL TARTRATE 50 MG
50 TABLET ORAL
Refills: 0 | Status: DISCONTINUED | OUTPATIENT
Start: 2024-02-26 | End: 2024-02-27

## 2024-02-26 RX ORDER — HYDROMORPHONE HYDROCHLORIDE 2 MG/ML
0.5 INJECTION INTRAMUSCULAR; INTRAVENOUS; SUBCUTANEOUS
Refills: 0 | Status: DISCONTINUED | OUTPATIENT
Start: 2024-02-26 | End: 2024-02-26

## 2024-02-26 RX ORDER — CEFAZOLIN SODIUM 1 G
2000 VIAL (EA) INJECTION EVERY 8 HOURS
Refills: 0 | Status: COMPLETED | OUTPATIENT
Start: 2024-02-26 | End: 2024-02-27

## 2024-02-26 RX ORDER — ENOXAPARIN SODIUM 100 MG/ML
40 INJECTION SUBCUTANEOUS
Qty: 5.6 | Refills: 0
Start: 2024-02-26 | End: 2024-03-10

## 2024-02-26 RX ADMIN — Medication 400 MILLIGRAM(S): at 14:01

## 2024-02-26 RX ADMIN — SODIUM CHLORIDE 150 MILLILITER(S): 9 INJECTION, SOLUTION INTRAVENOUS at 15:13

## 2024-02-26 RX ADMIN — Medication 400 MILLIGRAM(S): at 21:03

## 2024-02-26 RX ADMIN — ONDANSETRON 4 MILLIGRAM(S): 8 TABLET, FILM COATED ORAL at 15:12

## 2024-02-26 RX ADMIN — Medication 30 MILLIGRAM(S): at 21:04

## 2024-02-26 RX ADMIN — SODIUM CHLORIDE 250 MILLILITER(S): 9 INJECTION, SOLUTION INTRAVENOUS at 10:44

## 2024-02-26 RX ADMIN — Medication 50 MILLIGRAM(S): at 17:21

## 2024-02-26 RX ADMIN — Medication 0.12 MILLIGRAM(S): at 11:08

## 2024-02-26 RX ADMIN — SIMETHICONE 80 MILLIGRAM(S): 80 TABLET, CHEWABLE ORAL at 13:02

## 2024-02-26 RX ADMIN — HYDROMORPHONE HYDROCHLORIDE 0.5 MILLIGRAM(S): 2 INJECTION INTRAMUSCULAR; INTRAVENOUS; SUBCUTANEOUS at 11:40

## 2024-02-26 RX ADMIN — HEPARIN SODIUM 5000 UNIT(S): 5000 INJECTION INTRAVENOUS; SUBCUTANEOUS at 15:11

## 2024-02-26 RX ADMIN — PANTOPRAZOLE SODIUM 40 MILLIGRAM(S): 20 TABLET, DELAYED RELEASE ORAL at 15:13

## 2024-02-26 RX ADMIN — Medication 30 MILLIGRAM(S): at 16:00

## 2024-02-26 RX ADMIN — HYDROMORPHONE HYDROCHLORIDE 0.5 MILLIGRAM(S): 2 INJECTION INTRAMUSCULAR; INTRAVENOUS; SUBCUTANEOUS at 12:00

## 2024-02-26 RX ADMIN — Medication 30 MILLIGRAM(S): at 15:12

## 2024-02-26 RX ADMIN — SODIUM CHLORIDE 30 MILLILITER(S): 9 INJECTION, SOLUTION INTRAVENOUS at 06:25

## 2024-02-26 RX ADMIN — Medication 100 MILLIGRAM(S): at 15:12

## 2024-02-26 RX ADMIN — ONDANSETRON 4 MILLIGRAM(S): 8 TABLET, FILM COATED ORAL at 21:03

## 2024-02-26 RX ADMIN — Medication 25 MILLIGRAM(S): at 13:40

## 2024-02-26 RX ADMIN — FOSAPREPITANT DIMEGLUMINE 300 MILLIGRAM(S): 150 INJECTION, POWDER, LYOPHILIZED, FOR SOLUTION INTRAVENOUS at 06:50

## 2024-02-26 RX ADMIN — Medication 0.12 MILLIGRAM(S): at 18:46

## 2024-02-26 RX ADMIN — HEPARIN SODIUM 5000 UNIT(S): 5000 INJECTION INTRAVENOUS; SUBCUTANEOUS at 06:55

## 2024-02-26 NOTE — BRIEF OPERATIVE NOTE - OPERATION/FINDINGS
no hiatal hernia, normal appearing liver and stomach. previous adhesions noted from gallbladder surgery. Uncomplicated Robotic Assisted Sleeve Gastrectomy.

## 2024-02-26 NOTE — DIETITIAN INITIAL EVALUATION ADULT - ADD RECOMMEND
1) Continue bariatric clear liquid diet (Ensure max included on each tray) POD#1   2) Recommend post bariatric vitamin supplementation when medically appropriate  3) Recommend follow up with outpatient RD for long term weight management post bariatric surgery.

## 2024-02-26 NOTE — ASU PREOP CHECKLIST - INTERNAL PROSTHESES
left foot & left knee screws and screws in neck left foot & left knee screws and screws in neck/yes(specify)

## 2024-02-26 NOTE — DIETITIAN INITIAL EVALUATION ADULT - PERTINENT MEDS FT
MEDICATIONS  (STANDING):  acetaminophen   IVPB .. 1000 milliGRAM(s) IV Intermittent once  ceFAZolin   IVPB 2000 milliGRAM(s) IV Intermittent every 8 hours  heparin   Injectable 5000 Unit(s) SubCutaneous every 8 hours  influenza   Vaccine 0.5 milliLiter(s) IntraMuscular once  ketorolac   Injectable 30 milliGRAM(s) IV Push every 6 hours  lactated ringers. 1000 milliLiter(s) (150 mL/Hr) IV Continuous <Continuous>  metoprolol tartrate 50 milliGRAM(s) Oral two times a day  ondansetron Injectable 4 milliGRAM(s) IV Push every 6 hours  pantoprazole  Injectable 40 milliGRAM(s) IV Push every 24 hours  venlafaxine 25 milliGRAM(s) Oral daily    MEDICATIONS  (PRN):  hyoscyamine SL 0.125 milliGRAM(s) SubLingual every 4 hours PRN Gastric Spasms  simethicone 80 milliGRAM(s) Chew every 6 hours PRN Gas

## 2024-02-26 NOTE — DIETITIAN INITIAL EVALUATION ADULT - OTHER INFO
Medical course: Per chart 63 year old male with hx of HTN , HLD , DARON on CPAP , s/p C5-6 anterior cervical discectomy and fusion 2017 presents to PST with pre op dx of morbid or severe obesity due to excess calories is scheduled for robot vertical sleeve gastrectomy.     Nutrition interview: Pt A&Ox4, s/p gastric sleeve today. No recent episodes of nausea or vomiting. No known food allergies. Per HIE -270. Current weight 244lb indicating weight loss. Pt understanding of what's included in bariatric clear liquids post op day 1 and plan for advance to bariatric full liquid diet post op day 2: Gradually working towards 2oz of protein shake at the top of each hour and 2oz of clear liquids every 15 minutes. Discussed protein goals (60-80g/day) and acceptable liquids. Avoiding carbonated beverages, use of straws, concentrated sweets, and high fat liquids. Encouraged compliance with outpatient RD follow up for long term management and proper diet advancement.

## 2024-02-26 NOTE — CHART NOTE - NSCHARTNOTEFT_GEN_A_CORE
B Team Surgery Post-Op Eval      STATUS POST:  Robot-assisted sleeve gastrectomy        SUBJECTIVE: Pt seen and examined in recovery room. Patient sitting in chair comfortably. Tolerating liquids, pain is well controlled. Denies SOB, chest pain, palpitations, dizziness, fever, chills, N/V/D.      ---------------------------------------------------------------------------------------------   VITALS  T(C): 36.8 (02-26-24 @ 14:00), Max: 36.8 (02-26-24 @ 09:35)  HR: 99 (02-26-24 @ 14:00) (76 - 99)  BP: 129/70 (02-26-24 @ 14:00) (101/53 - 132/65)  RR: 12 (02-26-24 @ 14:00) (11 - 19)  SpO2: 95% (02-26-24 @ 14:00) (93% - 100%)  CAPILLARY BLOOD GLUCOSE      POCT Blood Glucose.: 124 mg/dL (26 Feb 2024 11:57)  POCT Blood Glucose.: 111 mg/dL (26 Feb 2024 09:37)  POCT Blood Glucose.: 91 mg/dL (26 Feb 2024 06:06)      Is/Os    02-26 @ 07:01  -  02-26 @ 14:11  --------------------------------------------------------  IN:    Lactated Ringers: 150 mL    Oral Fluid: 90 mL    sodium chloride 0.9% w/ Additives: 750 mL  Total IN: 990 mL    OUT:    Voided (mL): 350 mL  Total OUT: 350 mL    Total NET: 640 mL          ---------------------------------------------------------------------------------------------   PHYSICAL EXAM:   General: NAD, morbidly obese  Neuro: alert, oriented x3  Neck: Soft, supple  Cardio: RRR, nml S1/S2  Resp: Good effort, CTA b/l  GI/Abd: Soft, NT/ND, port site dressings c/d/i  Vascular: All 4 extremities warm. FROM.      ---------------------------------------------------------------------------------------------   MEDICATIONS (STANDING): acetaminophen   IVPB .. 1000 milliGRAM(s) IV Intermittent once  ceFAZolin   IVPB 2000 milliGRAM(s) IV Intermittent every 8 hours  heparin   Injectable 5000 Unit(s) SubCutaneous every 8 hours  ketorolac   Injectable 30 milliGRAM(s) IV Push every 6 hours  lactated ringers. 1000 milliLiter(s) IV Continuous <Continuous>  metoprolol tartrate 50 milliGRAM(s) Oral two times a day  ondansetron Injectable 4 milliGRAM(s) IV Push every 6 hours  pantoprazole  Injectable 40 milliGRAM(s) IV Push every 24 hours  tamsulosin 0.4 milliGRAM(s) Oral at bedtime  venlafaxine 25 milliGRAM(s) Oral daily    MEDICATIONS (PRN):HYDROmorphone  Injectable 0.5 milliGRAM(s) IV Push every 10 minutes PRN Severe Pain (7 - 10)  hyoscyamine SL 0.125 milliGRAM(s) SubLingual every 4 hours PRN Gastric Spasms  ondansetron Injectable 4 milliGRAM(s) IV Push once PRN Nausea and/or Vomiting  simethicone 80 milliGRAM(s) Chew every 6 hours PRN Gas      ---------------------------------------------------------------------------------------------   LABS  CBC (02-26 @ 10:03)                              13.4                           13.86<H>  )----------------(  263        81.2<H>% Neutrophils, 13.1  % Lymphocytes, ANC: 11.26<H>                              41.4      BMP (02-26 @ 10:03)             134<L>  |  100     |  13    		Ca++ --      Ca 9.0                ---------------------------------( 122<H>		Mg 2.00               4.6     |  23      |  0.82  			Ph 3.2                   IMAGING STUDIES      ---------------------------------------------------------------------------------------------       ASSESSMENT  63y male s/p  robot assisted sleeve gastrectomy, recovering well    PLAN  - Diet: clear liquid, bariatric diet  - Pain control with PO/IV medications PRN    - Continue home medications  - OOB, ambulate as tolerated  - lovenox VTE ppx      B Team Surgery  [id number] B Team Surgery Post-Op Eval      STATUS POST:  Robot-assisted sleeve gastrectomy        SUBJECTIVE: Pt seen and examined in recovery room. Patient sitting in chair comfortably. Tolerating liquids, pain is well controlled, voiding. Denies SOB, chest pain, palpitations, dizziness, fever, chills, N/V/D.      ---------------------------------------------------------------------------------------------   VITALS  T(C): 36.8 (02-26-24 @ 14:00), Max: 36.8 (02-26-24 @ 09:35)  HR: 99 (02-26-24 @ 14:00) (76 - 99)  BP: 129/70 (02-26-24 @ 14:00) (101/53 - 132/65)  RR: 12 (02-26-24 @ 14:00) (11 - 19)  SpO2: 95% (02-26-24 @ 14:00) (93% - 100%)  CAPILLARY BLOOD GLUCOSE      POCT Blood Glucose.: 124 mg/dL (26 Feb 2024 11:57)  POCT Blood Glucose.: 111 mg/dL (26 Feb 2024 09:37)  POCT Blood Glucose.: 91 mg/dL (26 Feb 2024 06:06)      Is/Os    02-26 @ 07:01  -  02-26 @ 14:11  --------------------------------------------------------  IN:    Lactated Ringers: 150 mL    Oral Fluid: 90 mL    sodium chloride 0.9% w/ Additives: 750 mL  Total IN: 990 mL    OUT:    Voided (mL): 350 mL  Total OUT: 350 mL    Total NET: 640 mL          ---------------------------------------------------------------------------------------------   PHYSICAL EXAM:   General: NAD, morbidly obese  Neuro: alert, oriented x3  Neck: Soft, supple  Cardio: RRR, nml S1/S2  Resp: Good effort, CTA b/l  GI/Abd: Soft, NT/ND, port site dressings c/d/i  Vascular: All 4 extremities warm. FROM.      ---------------------------------------------------------------------------------------------   MEDICATIONS (STANDING): acetaminophen   IVPB .. 1000 milliGRAM(s) IV Intermittent once  ceFAZolin   IVPB 2000 milliGRAM(s) IV Intermittent every 8 hours  heparin   Injectable 5000 Unit(s) SubCutaneous every 8 hours  ketorolac   Injectable 30 milliGRAM(s) IV Push every 6 hours  lactated ringers. 1000 milliLiter(s) IV Continuous <Continuous>  metoprolol tartrate 50 milliGRAM(s) Oral two times a day  ondansetron Injectable 4 milliGRAM(s) IV Push every 6 hours  pantoprazole  Injectable 40 milliGRAM(s) IV Push every 24 hours  tamsulosin 0.4 milliGRAM(s) Oral at bedtime  venlafaxine 25 milliGRAM(s) Oral daily    MEDICATIONS (PRN):HYDROmorphone  Injectable 0.5 milliGRAM(s) IV Push every 10 minutes PRN Severe Pain (7 - 10)  hyoscyamine SL 0.125 milliGRAM(s) SubLingual every 4 hours PRN Gastric Spasms  ondansetron Injectable 4 milliGRAM(s) IV Push once PRN Nausea and/or Vomiting  simethicone 80 milliGRAM(s) Chew every 6 hours PRN Gas      ---------------------------------------------------------------------------------------------   LABS  CBC (02-26 @ 10:03)                              13.4                           13.86<H>  )----------------(  263        81.2<H>% Neutrophils, 13.1  % Lymphocytes, ANC: 11.26<H>                              41.4      BMP (02-26 @ 10:03)             134<L>  |  100     |  13    		Ca++ --      Ca 9.0                ---------------------------------( 122<H>		Mg 2.00               4.6     |  23      |  0.82  			Ph 3.2                   IMAGING STUDIES      ---------------------------------------------------------------------------------------------       ASSESSMENT  63y male s/p  robot assisted sleeve gastrectomy, recovering well    PLAN  - Diet: clear liquid, bariatric diet  - Pain control with PO/IV medications PRN    - Continue home medications  - OOB, ambulate as tolerated  - lovenox VTE ppx      B Team Surgery  [id number]

## 2024-02-26 NOTE — ASU PREOP CHECKLIST - SELECT TESTS ORDERED
BMP/CBC/Type and Screen/EKG/POCT Blood Glucose 91 @ 6:06am/BMP/CBC/Type and Cross/Type and Screen/EKG/POCT Blood Glucose

## 2024-02-26 NOTE — DIETITIAN INITIAL EVALUATION ADULT - PERTINENT LABORATORY DATA
02-26    134<L>  |  100  |  13  ----------------------------<  122<H>  4.6   |  23  |  0.82    Ca    9.0      26 Feb 2024 10:03  Phos  3.2     02-26  Mg     2.00     02-26    POCT Blood Glucose.: 124 mg/dL (02-26-24 @ 11:57)  A1C with Estimated Average Glucose Result: 5.3 % (02-20-24 @ 07:35)

## 2024-02-26 NOTE — PATIENT PROFILE ADULT - FALL HARM RISK - HARM RISK INTERVENTIONS

## 2024-02-26 NOTE — DIETITIAN INITIAL EVALUATION ADULT - NS FNS DIET ORDER
Diet, Clear Liquid:   Bariatric Clear Liquid (BARICLLIQ)  Supplement Feeding Modality:  Oral  Ensure Max Cans or Servings Per Day:  1       Frequency:  Two Times a day (02-26-24 @ 09:50)

## 2024-02-27 ENCOUNTER — TRANSCRIPTION ENCOUNTER (OUTPATIENT)
Age: 64
End: 2024-02-27

## 2024-02-27 VITALS
HEART RATE: 79 BPM | DIASTOLIC BLOOD PRESSURE: 85 MMHG | OXYGEN SATURATION: 95 % | TEMPERATURE: 97 F | SYSTOLIC BLOOD PRESSURE: 142 MMHG | RESPIRATION RATE: 18 BRPM

## 2024-02-27 LAB
ANION GAP SERPL CALC-SCNC: 10 MMOL/L — SIGNIFICANT CHANGE UP (ref 7–14)
BASOPHILS # BLD AUTO: 0.01 K/UL — SIGNIFICANT CHANGE UP (ref 0–0.2)
BASOPHILS NFR BLD AUTO: 0.1 % — SIGNIFICANT CHANGE UP (ref 0–2)
BUN SERPL-MCNC: 11 MG/DL — SIGNIFICANT CHANGE UP (ref 7–23)
CALCIUM SERPL-MCNC: 9 MG/DL — SIGNIFICANT CHANGE UP (ref 8.4–10.5)
CHLORIDE SERPL-SCNC: 104 MMOL/L — SIGNIFICANT CHANGE UP (ref 98–107)
CO2 SERPL-SCNC: 23 MMOL/L — SIGNIFICANT CHANGE UP (ref 22–31)
CREAT SERPL-MCNC: 0.71 MG/DL — SIGNIFICANT CHANGE UP (ref 0.5–1.3)
EGFR: 103 ML/MIN/1.73M2 — SIGNIFICANT CHANGE UP
EOSINOPHIL # BLD AUTO: 0 K/UL — SIGNIFICANT CHANGE UP (ref 0–0.5)
EOSINOPHIL NFR BLD AUTO: 0 % — SIGNIFICANT CHANGE UP (ref 0–6)
GLUCOSE BLDC GLUCOMTR-MCNC: 110 MG/DL — HIGH (ref 70–99)
GLUCOSE BLDC GLUCOMTR-MCNC: 124 MG/DL — HIGH (ref 70–99)
GLUCOSE BLDC GLUCOMTR-MCNC: 133 MG/DL — HIGH (ref 70–99)
GLUCOSE SERPL-MCNC: 106 MG/DL — HIGH (ref 70–99)
HCT VFR BLD CALC: 37.5 % — LOW (ref 39–50)
HGB BLD-MCNC: 12.3 G/DL — LOW (ref 13–17)
IANC: 11.22 K/UL — HIGH (ref 1.8–7.4)
IMM GRANULOCYTES NFR BLD AUTO: 0.8 % — SIGNIFICANT CHANGE UP (ref 0–0.9)
LYMPHOCYTES # BLD AUTO: 1.36 K/UL — SIGNIFICANT CHANGE UP (ref 1–3.3)
LYMPHOCYTES # BLD AUTO: 10 % — LOW (ref 13–44)
MAGNESIUM SERPL-MCNC: 2 MG/DL — SIGNIFICANT CHANGE UP (ref 1.6–2.6)
MCHC RBC-ENTMCNC: 29.1 PG — SIGNIFICANT CHANGE UP (ref 27–34)
MCHC RBC-ENTMCNC: 32.8 GM/DL — SIGNIFICANT CHANGE UP (ref 32–36)
MCV RBC AUTO: 88.9 FL — SIGNIFICANT CHANGE UP (ref 80–100)
MONOCYTES # BLD AUTO: 0.87 K/UL — SIGNIFICANT CHANGE UP (ref 0–0.9)
MONOCYTES NFR BLD AUTO: 6.4 % — SIGNIFICANT CHANGE UP (ref 2–14)
NEUTROPHILS # BLD AUTO: 11.22 K/UL — HIGH (ref 1.8–7.4)
NEUTROPHILS NFR BLD AUTO: 82.7 % — HIGH (ref 43–77)
NRBC # BLD: 0 /100 WBCS — SIGNIFICANT CHANGE UP (ref 0–0)
NRBC # FLD: 0 K/UL — SIGNIFICANT CHANGE UP (ref 0–0)
PHOSPHATE SERPL-MCNC: 2.9 MG/DL — SIGNIFICANT CHANGE UP (ref 2.5–4.5)
PLATELET # BLD AUTO: 262 K/UL — SIGNIFICANT CHANGE UP (ref 150–400)
POTASSIUM SERPL-MCNC: 4.1 MMOL/L — SIGNIFICANT CHANGE UP (ref 3.5–5.3)
POTASSIUM SERPL-SCNC: 4.1 MMOL/L — SIGNIFICANT CHANGE UP (ref 3.5–5.3)
RBC # BLD: 4.22 M/UL — SIGNIFICANT CHANGE UP (ref 4.2–5.8)
RBC # FLD: 13 % — SIGNIFICANT CHANGE UP (ref 10.3–14.5)
SODIUM SERPL-SCNC: 137 MMOL/L — SIGNIFICANT CHANGE UP (ref 135–145)
WBC # BLD: 13.57 K/UL — HIGH (ref 3.8–10.5)
WBC # FLD AUTO: 13.57 K/UL — HIGH (ref 3.8–10.5)

## 2024-02-27 RX ORDER — RAMIPRIL 5 MG
10 CAPSULE ORAL
Qty: 0 | Refills: 0 | DISCHARGE
Start: 2024-02-27

## 2024-02-27 RX ORDER — OMEPRAZOLE 10 MG/1
1 CAPSULE, DELAYED RELEASE ORAL
Qty: 30 | Refills: 0
Start: 2024-02-27 | End: 2024-03-27

## 2024-02-27 RX ORDER — ENOXAPARIN SODIUM 100 MG/ML
40 INJECTION SUBCUTANEOUS
Qty: 5.6 | Refills: 0
Start: 2024-02-27 | End: 2024-03-11

## 2024-02-27 RX ORDER — RAMIPRIL 5 MG
2 CAPSULE ORAL
Refills: 0 | DISCHARGE

## 2024-02-27 RX ORDER — ACETAMINOPHEN 500 MG
15 TABLET ORAL
Qty: 300 | Refills: 0
Start: 2024-02-27 | End: 2024-03-02

## 2024-02-27 RX ORDER — HYOSCYAMINE SULFATE 0.13 MG
1 TABLET ORAL
Qty: 28 | Refills: 0
Start: 2024-02-27 | End: 2024-03-04

## 2024-02-27 RX ORDER — METOPROLOL TARTRATE 50 MG
1 TABLET ORAL
Qty: 60 | Refills: 0
Start: 2024-02-27 | End: 2024-03-27

## 2024-02-27 RX ORDER — ONDANSETRON 8 MG/1
1 TABLET, FILM COATED ORAL
Qty: 3 | Refills: 0
Start: 2024-02-27 | End: 2024-03-04

## 2024-02-27 RX ORDER — METOPROLOL TARTRATE 50 MG
1 TABLET ORAL
Refills: 0 | DISCHARGE

## 2024-02-27 RX ADMIN — ONDANSETRON 4 MILLIGRAM(S): 8 TABLET, FILM COATED ORAL at 03:24

## 2024-02-27 RX ADMIN — Medication 30 MILLIGRAM(S): at 09:23

## 2024-02-27 RX ADMIN — Medication 1000 MILLIGRAM(S): at 05:16

## 2024-02-27 RX ADMIN — Medication 30 MILLIGRAM(S): at 09:51

## 2024-02-27 RX ADMIN — HEPARIN SODIUM 5000 UNIT(S): 5000 INJECTION INTRAVENOUS; SUBCUTANEOUS at 09:23

## 2024-02-27 RX ADMIN — Medication 100 MILLIGRAM(S): at 00:05

## 2024-02-27 RX ADMIN — Medication 30 MILLIGRAM(S): at 03:25

## 2024-02-27 RX ADMIN — Medication 400 MILLIGRAM(S): at 11:34

## 2024-02-27 RX ADMIN — HEPARIN SODIUM 5000 UNIT(S): 5000 INJECTION INTRAVENOUS; SUBCUTANEOUS at 00:06

## 2024-02-27 RX ADMIN — Medication 50 MILLIGRAM(S): at 05:44

## 2024-02-27 RX ADMIN — ONDANSETRON 4 MILLIGRAM(S): 8 TABLET, FILM COATED ORAL at 09:23

## 2024-02-27 RX ADMIN — Medication 400 MILLIGRAM(S): at 03:30

## 2024-02-27 RX ADMIN — Medication 30 MILLIGRAM(S): at 03:52

## 2024-02-27 RX ADMIN — Medication 1000 MILLIGRAM(S): at 12:08

## 2024-02-27 NOTE — PROGRESS NOTE ADULT - ASSESSMENT
Assessment and Plan:  63y y/o Male s/p Robot-assisted sleeve gastrectomy, POD # 1  - continue IVF  - continue patti clears  - pain control per protocol  - SQH/SCD  - f/u AM labs  - encourage oob/amb  - Discharge home once tolerating adequate PO and 8 point discharge criteria met    will discuss with attending    Carlos Negron DO, PGY6  Minimally Invasive Surgery Fellow

## 2024-02-27 NOTE — DISCHARGE NOTE PROVIDER - NSDCFUSCHEDAPPT_GEN_ALL_CORE_FT
Richard Moon  [facility] Physician [facility]  [address]  Scheduled Appointment: 03/14/2024    Richard Moon  [facility]  [address]  Scheduled Appointment: 04/11/2024

## 2024-02-27 NOTE — DISCHARGE NOTE NURSING/CASE MANAGEMENT/SOCIAL WORK - PATIENT PORTAL LINK FT
You can access the FollowMyHealth Patient Portal offered by [facility] by registering at the following website: [email or web address]. By joining [facility]’s FollowMyHealth portal, you will also be able to view your health information using other applications (apps) compatible with our system.

## 2024-02-27 NOTE — DISCHARGE NOTE PROVIDER - NSDCFUADDINST_GEN_ALL_CORE_FT
PAIN CONTROL: You may take TYLENOL solution every 6 hours as needed. Do not take any anti-inflammatory pain medications unless approved by your surgeon, ( i.e. Ibuprofen, Advil, Motrin, Naprosyn, Aleve, etc.).   BATHING: You may shower; do not scrub incisions.   WOUND CARE: (Notify us of redness or drainage from incisions). If any significant drainage, note color, odor and amount. Wash with soap and gently pat dry.  ACTIVITY: Walk daily at least every 3 hours for first week after surgery. Check temperature twice daily for the first week. Use incentive spirometry 4 times daily for 10 days. Avoid heavy lifting (>20 lbs.) or straining for 4 weeks. When you do lift, keep your back straight and bend at the knees allowing your legs to do most of the work. Do not exercise until cleared by your surgeon    Patients may develop nausea and vomiting in the early postoperative period 1-2 weeks after surgery.   Symptoms may improve if the following medications are taken as needed. It is advisable that you complete the full dose of each medication given unless otherwise specified by your doctor. Medications taken by mouth except extended release may be crushed to make swallowing easier.     If you encounter a problem, contact your surgeon or return to the Emergency Room where you had your surgery, or have the physician where you go contact your surgeon immediately if you present for treatment. There are many physicians who are unfamiliar with weight loss surgery and serious errors in treatment can occur. DO NOT ALLOW ANYONE TO INSERT A STOMACH TUBE FOR ANY REASON WITHOUT X-RAY GUIDANCE.    Call your Surgeons office to report feelings of restlessness, fatigue, abdominal pain, nausea, vomiting, fever >101.5F. If you have calf pain and swelling, shortness of breath, difficulty breathing, and/or chest pain visit the closest emergency room immediately.    Feel free to contact your bariatric team with any questions.     Make an appointment with primary care physician and other medical providers within 30 days to evaluate need for any continued pre-op medications. Follow up with your nutritionist within 30 days.

## 2024-02-27 NOTE — DISCHARGE NOTE PROVIDER - HOSPITAL COURSE
62 y/o M patient with a PMHx morbid obesity due to excess calories presented for and is now s/p laparoscopic keon-en-y gastric bypass.  The patient tolerated the procedure well. The patient was transferred to the PACU post operatively. In the PACU the patient received IV Banana bag x1.  Patient ambulated 1-2 hours post operatively in PACU. Patient voided appropriately. The PACU utilized the Charlie Scoring system along with the patient specific Bariatric 8-Point PACU discharge criteria prior to transferring the patient to the floor. The patient was started on a full liquid diet including 60cc Ensure Max every hour. Prior to discharge, the patient was tolerating a minimum of 360cc of liquid w/ Ensures, voiding, ambulating independently, and their pain was controlled. The patient was evaluated by Nutrition,  and Pharmacy team on unit.  Prior to discharge, it was recommended the patient be discharged home on Lovenox for VTE prophylaxis. The patient received Lovenox teaching by the nursing team prior to discharge. The patient received home medications via Pharmacy "Meds to Beds" Program. The patient was instructed to follow up with Dr. Moon 1-2 weeks after discharge. 64 y/o M patient with a PMHx morbid obesity due to excess calories presented for and is now s/p laparoscopic keon-en-y gastric bypass.  The patient tolerated the procedure well. The patient was transferred to the PACU post operatively. In the PACU the patient received IV Banana bag x1.  Patient ambulated 1-2 hours post operatively in PACU. Patient voided appropriately. The PACU utilized the Charlie Scoring system along with the patient specific Bariatric 8-Point PACU discharge criteria prior to transferring the patient to the floor. The patient was started on a full liquid diet including 60cc Ensure Max every hour. Prior to discharge, the patient was tolerating a minimum of 360cc of liquid w/ Ensures, voiding, ambulating independently, and their pain was controlled. The patient was evaluated by Nutrition, and Pharmacy team on unit.  Prior to discharge, it was recommended the patient be discharged home on Lovenox for VTE prophylaxis. The patient received Lovenox teaching by the nursing team prior to discharge. The patient received home medications via Pharmacy "Meds to Beds" Program. The patient was instructed to follow up with Dr. Moon 1-2 weeks after discharge.

## 2024-02-27 NOTE — DISCHARGE NOTE PROVIDER - CARE PROVIDER_API CALL
Richard Moon  Surgery  [address] [place] [id number]  Phone: [phone]  Fax: [phone]  Follow Up Time: 2 weeks

## 2024-02-27 NOTE — DISCHARGE NOTE NURSING/CASE MANAGEMENT/SOCIAL WORK - NSDCPEFALRISK_GEN_ALL_CORE
For information on Fall & Injury Prevention, visit: [email or web address] OR  [email or web address] OR  https://www.cdc.gov/steadi/patient.html

## 2024-02-27 NOTE — DISCHARGE NOTE PROVIDER - NSDCMRMEDTOKEN_GEN_ALL_CORE_FT
acetaminophen 500 mg/15 mL oral liquid: 15 milliliter(s) orally every 6 hours as needed for pain  enoxaparin 40 mg/0.4 mL injectable solution: 40 milligram(s) subcutaneously once a day  Levsin SL 0.125 mg sublingual tablet: 1 tab(s) sublingually every 6 hours as needed for spasm  Metoprolol Tartrate 50 mg oral tablet: 1 tab(s) orally 2 times a day Crush tablet and mix with no sugar added applesauce  omeprazole 40 mg oral delayed release capsule: 1 cap(s) orally once a day open capsule and mix with no sugar added apple sauce  ondansetron 4 mg oral tablet, disintegratin tab(s) orally every 8 hours as needed for  nausea  ramipril: 10 milligram(s) orally once a day Crush and mix in no sugar added applesauce     (TWO 5mg tabs total of 10mg dose)  venlafaxine 25 mg oral tablet: 1 tab(s) orally once a day   acetaminophen 500 mg/15 mL oral liquid: 15 milliliter(s) orally every 6 hours as needed for pain  enoxaparin 40 mg/0.4 mL injectable solution: 40 milligram(s) subcutaneously once a day  Metoprolol Tartrate 50 mg oral tablet: 1 tab(s) orally 2 times a day Crush tablet and mix with no sugar added applesauce  omeprazole 40 mg oral delayed release capsule: 1 cap(s) orally once a day open capsule and mix with no sugar added apple sauce  ondansetron 4 mg oral tablet, disintegratin tab(s) orally every 8 hours as needed for  nausea  ramipril: 10 milligram(s) orally once a day Crush and mix in no sugar added applesauce     (TWO 5mg tabs total of 10mg dose)  venlafaxine 25 mg oral tablet: 1 tab(s) orally once a day

## 2024-02-27 NOTE — PROGRESS NOTE ADULT - ATTENDING COMMENTS
I also reviewed the operative findings and data with the patient and staff    D/W patient, RN, residents and Fellow    Doing well, POD# 1 s/p RA-LVSG.  Continue postop protocol.  Dietary changes reinforced.  Home when meets d/c criteria.  f/u appointments confirmed.

## 2024-02-27 NOTE — PROGRESS NOTE ADULT - SUBJECTIVE AND OBJECTIVE BOX
Bariatric Surgery Progress Note    Post Op Day#: 1    24 hr events/Subjective: patient seen and examined, no acute events overnight. pain well controlled. tolerating CLD without n/v. NO f/c. voiding well. +oob/amb. NO other complaints at this time,      Procedure:  Robot-assisted sleeve gastrectomy        Vital Signs Last 24 Hrs  T(C): 36.6 (27 Feb 2024 05:07), Max: 36.8 (26 Feb 2024 09:35)  T(F): 97.9 (27 Feb 2024 05:07), Max: 98.3 (26 Feb 2024 20:53)  HR: 82 (27 Feb 2024 05:07) (76 - 99)  BP: 131/66 (27 Feb 2024 05:07) (101/53 - 146/76)  BP(mean): 83 (26 Feb 2024 14:00) (64 - 102)  RR: 18 (27 Feb 2024 05:07) (11 - 19)  SpO2: 93% (27 Feb 2024 05:07) (93% - 100%)    Parameters below as of 27 Feb 2024 05:07  Patient On (Oxygen Delivery Method): room air      Height (cm): 177.8 (02-26 @ 06:13)  Weight (kg): 111.1 (02-26 @ 06:13)  BMI (kg/m2): 35.1 (02-26 @ 06:13)  BSA (m2): 2.28 (02-26 @ 06:13)  I&O's Summary    26 Feb 2024 07:01  -  27 Feb 2024 06:05  --------------------------------------------------------  IN: 3230 mL / OUT: 1900 mL / NET: 1330 mL      I&O's Detail    26 Feb 2024 07:01  -  27 Feb 2024 06:05  --------------------------------------------------------  IN:    IV PiggyBack: 200 mL    Lactated Ringers: 1650 mL    Oral Fluid: 630 mL    sodium chloride 0.9% w/ Additives: 750 mL  Total IN: 3230 mL    OUT:    Voided (mL): 1900 mL  Total OUT: 1900 mL    Total NET: 1330 mL          Physical Exam:    General:  Appears stated age, well-groomed, well-nourished, no distress  Chest:  clear breath sounds  Cardiovascular:  Regular rate & rhythm  Abdomen:  Soft, incisions clean, dry intact, tenderness around incisions, no rebound or guarding  Skin:  No rash  Neuro/Psych:  Alert, oriented to time, place and person                           13.4   13.86 )-----------( 263      ( 26 Feb 2024 10:03 )             41.4       02-26    134<L>  |  100  |  13  ----------------------------<  122<H>  4.6   |  23  |  0.82    Ca    9.0      26 Feb 2024 10:03  Phos  3.2     02-26  Mg     2.00     02-26        acetaminophen   IVPB .. milliGRAM(s) IV Intermittent once  heparin   Injectable Unit(s) SubCutaneous every 8 hours  hyoscyamine SL milliGRAM(s) SubLingual every 4 hours PRN  influenza   Vaccine milliLiter(s) IntraMuscular once  ketorolac   Injectable milliGRAM(s) IV Push every 6 hours  lactated ringers. milliLiter(s) IV Continuous <Continuous>  metoprolol tartrate milliGRAM(s) Oral two times a day  ondansetron Injectable milliGRAM(s) IV Push every 6 hours  pantoprazole  Injectable milliGRAM(s) IV Push every 24 hours  simethicone milliGRAM(s) Chew every 6 hours PRN  venlafaxine milliGRAM(s) Oral daily

## 2024-02-27 NOTE — DISCHARGE NOTE PROVIDER - CARE PROVIDERS DIRECT ADDRESSES
[email or web address]

## 2024-03-04 LAB — SURGICAL PATHOLOGY STUDY: SIGNIFICANT CHANGE UP

## 2024-03-08 ENCOUNTER — NON-APPOINTMENT (OUTPATIENT)
Age: 64
End: 2024-03-08

## 2024-03-14 ENCOUNTER — APPOINTMENT (OUTPATIENT)
Dept: SURGERY | Facility: CLINIC | Age: 64
End: 2024-03-14
Payer: COMMERCIAL

## 2024-03-14 ENCOUNTER — NON-APPOINTMENT (OUTPATIENT)
Age: 64
End: 2024-03-14

## 2024-03-14 VITALS
WEIGHT: 233 LBS | HEART RATE: 75 BPM | SYSTOLIC BLOOD PRESSURE: 126 MMHG | BODY MASS INDEX: 33.36 KG/M2 | DIASTOLIC BLOOD PRESSURE: 83 MMHG | RESPIRATION RATE: 16 BRPM | HEIGHT: 70 IN | OXYGEN SATURATION: 98 % | TEMPERATURE: 96.6 F

## 2024-03-14 PROCEDURE — 99024 POSTOP FOLLOW-UP VISIT: CPT

## 2024-03-14 NOTE — PHYSICAL EXAM
[de-identified] : Anicteric, mucous membranes moist [de-identified] : Normoactive bowel sounds, no hepatosplenomegaly, no masses, non-tender. [de-identified] : Healed without drainage redness or swelling

## 2024-03-14 NOTE — HISTORY OF PRESENT ILLNESS
[de-identified] : First post op visit No complaints Tolerating diet without difficulty Very happy with hospital experience as well as results to date No fever/chills, no chest pain/shortness of breath, Ready to advance diet. [Phase 1] : Phase 1 [Walking] : walking [Diabetes] : no Diabetes [GERD] : no GERD [Hyperlipidemia] : no Hyperlipidemia

## 2024-03-14 NOTE — ASSESSMENT
[de-identified] : Plan: high-protein low-carb diet; advance to phase 2 Dietary counseling provided Increase activities as tolerated; no restrictions Check blood work; next visit vitamins daily Follow-up 1 month

## 2024-04-18 ENCOUNTER — APPOINTMENT (OUTPATIENT)
Dept: SURGERY | Facility: CLINIC | Age: 64
End: 2024-04-18
Payer: COMMERCIAL

## 2024-04-18 VITALS — BODY MASS INDEX: 31.14 KG/M2 | WEIGHT: 217 LBS

## 2024-04-18 PROBLEM — E66.01 MORBID (SEVERE) OBESITY DUE TO EXCESS CALORIES: Chronic | Status: ACTIVE | Noted: 2024-02-20

## 2024-04-18 PROCEDURE — 99024 POSTOP FOLLOW-UP VISIT: CPT

## 2024-04-18 NOTE — ASSESSMENT
[Previous Visit Weight: ___] : Previous visit weight: [unfilled] lbs [___ Months Post Op] : [unfilled] months [de-identified] : Plan: Continue high-protein low-carb diet Avoid sugar and starch, no more than 25 g of carbohydrates per day. Dietary counseling provided Increase activities as tolerated Check blood work Continue vitamins daily Follow-up 1.5 months

## 2024-04-18 NOTE — PHYSICAL EXAM
[de-identified] : WNL [de-identified] : WNL [de-identified] : WILLOWL  [de-identified] : Looks well, taking call from work.

## 2024-04-18 NOTE — HISTORY OF PRESENT ILLNESS
[Procedure: ___] : Procedure performed: [unfilled]  [Date of Surgery: ___] : Date of Surgery:   [unfilled] [Surgeon Name:   ___] : Surgeon Name: Dr. LICONA [Pre-Op Weight ___] : Pre-op weight was [unfilled] lbs [___ Months Post Op] : [unfilled] months [Phase 4] : Phase 4 [Hypertension] : Hypertension [Sleep Apnea] : Sleep Apnea [de-identified] : No complaints Tolerating diet Wants to start to take pills Has occasional Ritz crackers otherwise following diet  [___Kcal] : [unfilled] Kcal [___gm Protein] : [unfilled] gm protein [Walking] : walking [Diabetes] : no Diabetes [GERD] : no GERD [Hyperlipidemia] : no Hyperlipidemia

## 2024-06-13 ENCOUNTER — APPOINTMENT (OUTPATIENT)
Dept: SURGERY | Facility: CLINIC | Age: 64
End: 2024-06-13
Payer: COMMERCIAL

## 2024-06-13 VITALS
WEIGHT: 198 LBS | TEMPERATURE: 98.7 F | HEIGHT: 70 IN | BODY MASS INDEX: 28.35 KG/M2 | HEART RATE: 80 BPM | OXYGEN SATURATION: 99 % | SYSTOLIC BLOOD PRESSURE: 131 MMHG | RESPIRATION RATE: 16 BRPM | DIASTOLIC BLOOD PRESSURE: 86 MMHG

## 2024-06-13 DIAGNOSIS — Z98.84 BARIATRIC SURGERY STATUS: ICD-10-CM

## 2024-06-13 PROCEDURE — 99213 OFFICE O/P EST LOW 20 MIN: CPT

## 2024-06-13 NOTE — HISTORY OF PRESENT ILLNESS
[Procedure: ___] : Procedure performed: [unfilled]  [Date of Surgery: ___] : Date of Surgery:   [unfilled] [Surgeon Name:   ___] : Surgeon Name: Dr. LICONA [Pre-Op Weight ___] : Pre-op weight was [unfilled] lbs [___ Months Post Op] : [unfilled] months [Phase 4] : Phase 4 [___Kcal] : [unfilled] Kcal [___gm Protein] : [unfilled] gm protein [Walking] : walking [Hypertension] : Hypertension [Sleep Apnea] : Sleep Apnea [de-identified] : Patient is doing very well since last visit. The only issue he has is he never feels full. He is eating 600-800 calories a day, 2-3 protein shakes and no carbs. He exercises regularly but just cannot curb his appetite. Taking Vitamins. BMs every 2-3 days.  Labs done 4/23/24 - no concerns [Diabetes] : no Diabetes [GERD] : no GERD [Hyperlipidemia] : no Hyperlipidemia

## 2024-06-13 NOTE — PHYSICAL EXAM
[de-identified] : Looks well [de-identified] : CTA [de-identified] : RRR [de-identified] : soft, NT/ND

## 2024-06-13 NOTE — END OF VISIT
This pt. Is scheduled for Outpatient surgery on 8/21/2020 with Dr. Ceballos  at [facility] under MAC anesthesia Pro: Excision of Chest wall cyst [id number] Dx: Sebaceous Chest wall cyst L72.3 pt. Expressed understanding     All this pt. Need is EKG, and H&P please give pt. A call to schedule also pt. Has an EKG in chart not sure if it is normal or not EKG is good for 6 months if normal    [Time Spent: ___ minutes] : I have spent [unfilled] minutes of time on the encounter.

## 2024-06-13 NOTE — ASSESSMENT
[___ Months Post Op] : [unfilled] months [Previous Visit Weight: ___] : Previous visit weight: [unfilled] lbs [de-identified] : Plan: Continue high-protein low-carb diet Avoid sugar and starch, no more than 25 g of carbohydrates per day. Avoid liquid calories Dietary counseling provided Increase activities as tolerated Check blood work Continue vitamins daily Follow-up 6 months Pre op weight = 251 lbs Current weight = 198 Goal - 175

## 2024-06-18 LAB
25(OH)D3 SERPL-MCNC: 29.2 NG/ML
ALBUMIN SERPL ELPH-MCNC: 4.1 G/DL
ALP BLD-CCNC: 94 U/L
ALT SERPL-CCNC: 18 U/L
ANION GAP SERPL CALC-SCNC: 12 MMOL/L
AST SERPL-CCNC: 19 U/L
BILIRUB SERPL-MCNC: 1.2 MG/DL
BUN SERPL-MCNC: 12 MG/DL
CALCIUM SERPL-MCNC: 9.7 MG/DL
CHLORIDE SERPL-SCNC: 104 MMOL/L
CHOLEST SERPL-MCNC: 169 MG/DL
CO2 SERPL-SCNC: 21 MMOL/L
CREAT SERPL-MCNC: 0.75 MG/DL
EGFR: 101 ML/MIN/1.73M2
ESTIMATED AVERAGE GLUCOSE: 82 MG/DL
FERRITIN SERPL-MCNC: 430 NG/ML
FOLATE SERPL-MCNC: 7.6 NG/ML
GLUCOSE SERPL-MCNC: 112 MG/DL
HBA1C MFR BLD HPLC: 4.5 %
HCT VFR BLD CALC: 41.4 %
HDLC SERPL-MCNC: 37 MG/DL
HGB BLD-MCNC: 13.1 G/DL
IRON SATN MFR SERPL: 33 %
IRON SERPL-MCNC: 92 UG/DL
LDLC SERPL CALC-MCNC: 108 MG/DL
MCHC RBC-ENTMCNC: 30.5 PG
MCHC RBC-ENTMCNC: 31.6 GM/DL
MCV RBC AUTO: 96.3 FL
NONHDLC SERPL-MCNC: 132 MG/DL
PLATELET # BLD AUTO: 257 K/UL
POTASSIUM SERPL-SCNC: 4.1 MMOL/L
PROT SERPL-MCNC: 6.7 G/DL
RBC # BLD: 4.3 M/UL
RBC # FLD: 13.7 %
SODIUM SERPL-SCNC: 137 MMOL/L
TIBC SERPL-MCNC: 281 UG/DL
TRIGL SERPL-MCNC: 131 MG/DL
UIBC SERPL-MCNC: 189 UG/DL
VIT B12 SERPL-MCNC: 409 PG/ML
WBC # FLD AUTO: 8.74 K/UL

## 2024-06-25 LAB
VIT B1 SERPL-MCNC: 123.2 NMOL/L
VIT B6 SERPL-MCNC: 7.2 UG/L

## 2024-08-27 ENCOUNTER — NON-APPOINTMENT (OUTPATIENT)
Age: 64
End: 2024-08-27

## 2024-08-27 NOTE — PHYSICAL EXAM
[de-identified] : Looks well [de-identified] : CTA [de-identified] : RRR [de-identified] : soft, NT/ND

## 2024-08-27 NOTE — HISTORY OF PRESENT ILLNESS
[Procedure: ___] : Procedure performed: [unfilled]  [Date of Surgery: ___] : Date of Surgery:   [unfilled] [Surgeon Name:   ___] : Surgeon Name: Dr. LICONA [Pre-Op Weight ___] : Pre-op weight was [unfilled] lbs [___ Months Post Op] : [unfilled] months [Phase 4] : Phase 4 [___Kcal] : [unfilled] Kcal [___gm Protein] : [unfilled] gm protein [Walking] : walking [Hypertension] : Hypertension [Sleep Apnea] : Sleep Apnea [de-identified] : Patient is doing very well since last visit. The only issue he has is he never feels full. He is eating 600-800 calories a day, 2-3 protein shakes and no carbs. He exercises regularly but just cannot curb his appetite. Taking Vitamins. BMs every 2-3 days.  Labs done 4/23/24 - no concerns [Diabetes] : no Diabetes [GERD] : no GERD [Hyperlipidemia] : no Hyperlipidemia

## 2024-08-27 NOTE — ASSESSMENT
[___ Months Post Op] : [unfilled] months [Previous Visit Weight: ___] : Previous visit weight: [unfilled] lbs [de-identified] : Plan: Continue high-protein low-carb diet Avoid sugar and starch, no more than 25 g of carbohydrates per day. Avoid liquid calories Dietary counseling provided Increase activities as tolerated Continue vitamins daily Follow-up 6 months Pre op weight = 251 lbs Current weight =  Goal - 175

## 2024-09-05 ENCOUNTER — APPOINTMENT (OUTPATIENT)
Dept: SURGERY | Facility: CLINIC | Age: 64
End: 2024-09-05
Payer: COMMERCIAL

## 2024-09-05 VITALS
BODY MASS INDEX: 25.91 KG/M2 | HEIGHT: 70 IN | TEMPERATURE: 98 F | DIASTOLIC BLOOD PRESSURE: 84 MMHG | HEART RATE: 76 BPM | OXYGEN SATURATION: 99 % | WEIGHT: 181 LBS | SYSTOLIC BLOOD PRESSURE: 137 MMHG | RESPIRATION RATE: 18 BRPM

## 2024-09-05 DIAGNOSIS — Z98.84 BARIATRIC SURGERY STATUS: ICD-10-CM

## 2024-09-05 PROCEDURE — 99213 OFFICE O/P EST LOW 20 MIN: CPT

## 2024-09-05 NOTE — PHYSICAL EXAM
[de-identified] : Looks well [de-identified] : CTA [de-identified] : RRR [de-identified] : soft, NT/ND

## 2024-09-05 NOTE — ASSESSMENT
[Today's Weight: ___] : Today's weight:[unfilled] lbs [de-identified] : Plan: Continue high-protein low-carb diet Avoid sugar and starch, no more than 25 g of carbohydrates per day. Avoid liquid calories Dietary counseling provided Increase activities as tolerated Continue vitamins daily Bowel regimen Follow-up 6 months Pre op weight = 251 lbs Current weight = 181 Goal - 175

## 2024-09-05 NOTE — HISTORY OF PRESENT ILLNESS
[de-identified] : Patient is doing very well since last visit. Feeling more satiated. He is eating 600-800 calories a day, 2-3 protein shakes and no carbs. He exercises regularly but just cannot curb his appetite. Taking Vitamins. BMs every 2-3 days. Eventually will consider plastics for skin removal.  Labs done 4/23/24 - no concerns [Diabetes] : no Diabetes [GERD] : no GERD [Hyperlipidemia] : no Hyperlipidemia

## 2024-09-05 NOTE — HISTORY OF PRESENT ILLNESS
[de-identified] : Patient is doing very well since last visit. Feeling more satiated. He is eating 600-800 calories a day, 2-3 protein shakes and no carbs. He exercises regularly but just cannot curb his appetite. Taking Vitamins. BMs every 2-3 days. Eventually will consider plastics for skin removal.  Labs done 4/23/24 - no concerns [Diabetes] : no Diabetes [GERD] : no GERD [Hyperlipidemia] : no Hyperlipidemia

## 2024-09-05 NOTE — ASSESSMENT
[Today's Weight: ___] : Today's weight:[unfilled] lbs [de-identified] : Plan: Continue high-protein low-carb diet Avoid sugar and starch, no more than 25 g of carbohydrates per day. Avoid liquid calories Dietary counseling provided Increase activities as tolerated Continue vitamins daily Bowel regimen Follow-up 6 months Pre op weight = 251 lbs Current weight = 181 Goal - 175

## 2024-09-05 NOTE — PHYSICAL EXAM
[de-identified] : Looks well [de-identified] : CTA [de-identified] : RRR [de-identified] : soft, NT/ND

## 2025-01-29 ENCOUNTER — NON-APPOINTMENT (OUTPATIENT)
Age: 65
End: 2025-01-29

## 2025-02-06 ENCOUNTER — APPOINTMENT (OUTPATIENT)
Dept: SURGERY | Facility: CLINIC | Age: 65
End: 2025-02-06
Payer: COMMERCIAL

## 2025-02-06 VITALS
SYSTOLIC BLOOD PRESSURE: 128 MMHG | OXYGEN SATURATION: 99 % | RESPIRATION RATE: 16 BRPM | TEMPERATURE: 97.3 F | DIASTOLIC BLOOD PRESSURE: 83 MMHG | BODY MASS INDEX: 25.2 KG/M2 | WEIGHT: 176 LBS | HEART RATE: 68 BPM | HEIGHT: 70 IN

## 2025-02-06 DIAGNOSIS — Z98.84 BARIATRIC SURGERY STATUS: ICD-10-CM

## 2025-02-06 PROCEDURE — 99214 OFFICE O/P EST MOD 30 MIN: CPT

## 2025-07-14 NOTE — DISCHARGE NOTE PROVIDER - REASON FOR ADMISSION
Please refer to the attached information for strict return instructions. If symptoms worsen or new symptoms develop please return to the ER.    
sleeve gastrectomy
